# Patient Record
Sex: FEMALE | Race: WHITE | Employment: OTHER | URBAN - METROPOLITAN AREA
[De-identification: names, ages, dates, MRNs, and addresses within clinical notes are randomized per-mention and may not be internally consistent; named-entity substitution may affect disease eponyms.]

---

## 2017-04-06 ENCOUNTER — OFFICE VISIT (OUTPATIENT)
Dept: SLEEP MEDICINE | Facility: CLINIC | Age: 82
End: 2017-04-06
Attending: INTERNAL MEDICINE
Payer: MEDICARE

## 2017-04-06 VITALS
SYSTOLIC BLOOD PRESSURE: 134 MMHG | HEIGHT: 63 IN | HEART RATE: 69 BPM | BODY MASS INDEX: 22.15 KG/M2 | DIASTOLIC BLOOD PRESSURE: 70 MMHG | OXYGEN SATURATION: 95 % | WEIGHT: 125 LBS

## 2017-04-06 DIAGNOSIS — F51.04 PSYCHOPHYSIOLOGICAL INSOMNIA: Primary | ICD-10-CM

## 2017-04-06 PROCEDURE — 99211 OFF/OP EST MAY X REQ PHY/QHP: CPT | Mod: ZF

## 2017-04-06 RX ORDER — ZOLPIDEM TARTRATE 10 MG/1
10 TABLET ORAL
Qty: 30 TABLET | Refills: 5 | Status: SHIPPED | OUTPATIENT
Start: 2017-04-06 | End: 2017-08-10 | Stop reason: SINTOL

## 2017-04-06 NOTE — NURSING NOTE
"Chief Complaint   Patient presents with     RECHECK     Follow up        Initial Ht 1.588 m (5' 2.5\")  Wt 56.7 kg (125 lb)  BMI 22.5 kg/m2 Estimated body mass index is 22.5 kg/(m^2) as calculated from the following:    Height as of this encounter: 1.588 m (5' 2.5\").    Weight as of this encounter: 56.7 kg (125 lb).  Medication Reconciliation: complete      THAD Gonzalez      "

## 2017-04-06 NOTE — PROGRESS NOTES
Abi Whitehead is a 89-year-old female who has had insomnia since childhood and has been on multiple medications for control of insomnia.  She does have a tendency for sleep phase delay; however, she does not attempt to sleep until she is sleepy around midnight.  She is also bothered by chronic back pain and occasionally lies awake related to pain.  Most often, however, she has difficulty falling asleep secondary to an active mind.  She will listen to music or radio shows and occasionally watch television during the night if she has difficulty falling asleep.  We have encouraged her to avoid light exposure including television at night.      This patient has been using zolpidem 10 mg at night without undue carry over in the morning and without confusion at night or falls.  She has been informed about the increased risk of the use of sleep promoting agents at night and this particular dose does exceed recommendations; however, it has been the only effective therapy for her insomnia and she has not had untoward effects using it over a prolonged period of time.  This patient does not have other features of insomnia, such as restless legs syndrome and her sleep timing appears appropriate.  We do question the possibility that she may have inappropriate light exposure at night still at this point or that she may have paradoxical insomnia with over estimation of her wake time.  In this respect, we have ordered actigraphy to better assess her sleep schedule and light exposure over the next 2 weeks.  We will see her in 6 monthly intervals.  We have  discussed possible complications of sleep promoting agents and safety in the home.      Total time spent with the patient 25 minutes, greater than 50% counseling.         MIGDALIA RAMÍREZ MD             D: 2017 12:19   T: 2017 12:59   MT:       Name:     ABI WHITEHEAD   MRN:      [id number]        Account:      [id number]   :      1924           Visit  Date:   04/06/2017      Document: [id number]

## 2017-07-03 ENCOUNTER — TELEPHONE (OUTPATIENT)
Dept: SLEEP MEDICINE | Facility: CLINIC | Age: 82
End: 2017-07-03

## 2017-07-03 NOTE — TELEPHONE ENCOUNTER
[facility] called stating has been filling rx before refill dates on multiple occasion and Pharmacy is not comfortable refill current request for Ambien.  Pharmacy states rx has been filled on 5/01, 5/23, 6/09 and request for today.  Pharmacist would like an ok from Dr. Barrientos before filling.    Per Dr. Barrientos pt will need to be seen before allowed next refill.    THAD Gonzalez

## 2017-08-10 ENCOUNTER — MYC MEDICAL ADVICE (OUTPATIENT)
Dept: SLEEP MEDICINE | Facility: CLINIC | Age: 82
End: 2017-08-10

## 2017-08-10 NOTE — TELEPHONE ENCOUNTER
Ruthie Mccray MA 8/10/2017 8:20 AM CDT        ----- Message -----   From: Poly Toussaint   Sent: 8/10/2017 8:03 AM   To: Sleep [facility]ck[facility]  Subject: Question about medications     Good morning. I am writing because my Mom is having issues with her Ambien now. I think it's because she was without it for a month and now it's too much for her system. I would like to have you cancel her prescription. When she got her new prescription she started taking the original dose which is 1 pill and both nights was up walking around doing and saying weird things and not knowing where she was. So the family suggested she cut the pill in 1/2 or 1/4 since we thought that would work, but even with 1/4 she is having some issues. Like not knowing whether it's morning or night. I understand these are the side effects of Ambien and that's why it's considered a controlled med. She did not have any of these issues the month she was not taking the Ambien. But she didn't sleep well either and was pretty tired. She has agreed to not taking the Ambien anymore and has moved to taking 1 unisom and 1 Ibuprophen Pm. Not sure about these and whether they are a bad combo or not, but  they give her a little relief. You mentioned you didn't have any other options for her.

## 2017-10-02 ENCOUNTER — TELEPHONE (OUTPATIENT)
Dept: SLEEP MEDICINE | Facility: CLINIC | Age: 82
End: 2017-10-02

## 2017-10-02 NOTE — TELEPHONE ENCOUNTER
Good morning,     Can you have DOD give 1.75m ambien Rx x 1 mos for this patient.     Thx, Con      Please print our RX and give to Sukhjinder.  Pt will need to be called to see what pharmacy she would like rx to go to.    THAD Gonzalez

## 2017-10-04 RX ORDER — ZOLPIDEM TARTRATE 1.75 MG/1
1 TABLET SUBLINGUAL
Qty: 30 TABLET | Refills: 0 | Status: SHIPPED | OUTPATIENT
Start: 2017-10-04 | End: 2017-10-12 | Stop reason: SINTOL

## 2017-10-05 NOTE — TELEPHONE ENCOUNTER
Spoke with pt and she has asked that Rx be called into [facility] in [place].    Rx has been called in.    RANCHO GonzalezA

## 2017-10-12 ENCOUNTER — VIRTUAL VISIT (OUTPATIENT)
Dept: SLEEP MEDICINE | Facility: CLINIC | Age: 82
End: 2017-10-12
Payer: MEDICARE

## 2017-10-12 DIAGNOSIS — F51.04 PSYCHOPHYSIOLOGICAL INSOMNIA: Primary | ICD-10-CM

## 2017-10-12 NOTE — MR AVS SNAPSHOT
After Visit Summary   10/12/2017    Poly Toussaint    MRN: [id number]           Patient Information     Date Of Birth          5/11/1924        Visit Information        Provider Department      10/12/2017 2:45 PM [facility]os MD [facility]        Today's Diagnoses     Psychophysiological insomnia    -  1       Follow-ups after your visit        Who to contact     If you have questions or need follow up information about today's clinic visit or your schedule please contact [facility] directly at [phone].  Normal or non-critical lab and imaging results will be communicated to you by Getuihart, letter or phone within 4 business days after the clinic has received the results. If you do not hear from us within 7 days, please contact the clinic through Nutrisystemt or phone. If you have a critical or abnormal lab result, we will notify you by phone as soon as possible.  Submit refill requests through iZ3D or call your pharmacy and they will forward the refill request to us. Please allow 3 business days for your refill to be completed.          Additional Information About Your Visit        MyChart Information     iZ3D gives you secure access to your electronic health record. If you see a primary care provider, you can also send messages to your care team and make appointments. If you have questions, please call your primary care clinic.  If you do not have a primary care provider, please call [phone] and they will assist you.        Care EveryWhere ID     This is your Care EveryWhere ID. This could be used by other organizations to access your [facility] medical records  [id number]         Blood Pressure from Last 3 Encounters:   04/06/17 134/70   10/03/16 140/60   10/03/13 129/55    Weight from Last 3 Encounters:   04/06/17 56.7 kg (125 lb)   10/03/16 56.7 kg (124 lb 14.4 oz)   10/03/13 54.9 kg (121 lb 1.6 oz)              Today, you had the following     No  orders found for display         Today's Medication Changes          These changes are accurate as of: 10/12/17 11:59 PM.  If you have any questions, ask your nurse or doctor.               Stop taking these medicines if you haven't already. Please contact your care team if you have questions.     Zolpidem Tartrate 1.75 MG Subl   Commonly known as:  INTERMYANY                    Primary Care Provider Office Phone # Fax #    Argelia Colin [phone]       [address] [address]        Equal Access to Services     [facility]: Hadii aad ku hadasho Soomaali, waaxda luqadaha, qaybta kaalmada adeegyada, davin woods . So [facility] [phone].    ATENCIÓN: Si habla español, tiene a larkin disposición servicios gratuitos de asistencia lingüística. Eric al [phone].    We comply with applicable federal civil rights laws and [place] laws. We do not discriminate on the basis of race, color, national origin, age, disability, sex, sexual orientation, or gender identity.            Thank you!     Thank you for choosing [facility] [place]  for your care. Our goal is always to provide you with excellent care. Hearing back from our patients is one way we can continue to improve our services. Please take a few minutes to complete the written survey that you may receive in the mail after your visit with us. Thank you!             Your Updated Medication List - Protect others around you: Learn how to safely use, store and throw away your medicines at www.disposemymeds.org.          This list is accurate as of: 10/12/17 11:59 PM.  Always use your most recent med list.                   Brand Name Dispense Instructions for use Diagnosis    aspirin 81 MG EC tablet      Take 81 mg by mouth daily.        atenolol 50 MG tablet    TENORMIN     Take 50 mg by mouth 2 times daily.        CITRACAL/VITAMIN D PO      Take 1 tablet by mouth  2 times daily.        DAILY MULTIVITAMIN PO      Take 0.5 tablets by mouth 2 times daily.        LEVOXYL 75 MCG tablet   Generic drug:  levothyroxine      Take 75 mcg by mouth daily.        LIBRAX PO      Take 1 tablet by mouth daily as needed.        magnesium 250 MG tablet      Take 1 tablet by mouth 2 times daily.        omega-3 fatty acids 1200 MG capsule      Take 1 capsule by mouth 2 times daily.        Turmeric Curcumin Caps      Take 2 capsules by mouth daily.        VITAMIN B COMPLEX PO      Take 0.5 tablets by mouth 2 times daily.

## 2017-10-13 NOTE — PROGRESS NOTES
Poly Toussaint is a 89-year-old woman who has had prolonged history of psychophysiological insomnia and short sleep time, which she has self-managed with multiple sleep promoting agents.  When I first met with Ms. Toussaint in 2016, she acknowledged approximately 4 to 5 hours sleep on a regular basis with a tendency for mild sleep phase delay and did acknowledge taking several agents including diphenhydramine, melatonin, and 10 mg of zolpidem with difficulty initiating sleep.  She was opposed to decreasing the dose of zolpidem or changing any of her medications and simply wanted a refill of the medication.  We have told Ms. Toussaint and her daughter who phoned today that the use of multiple agents, and in particular multiple diphenhydramine agents, is potentially hazardous.  It appears that she has also been prescribed chlordiazepoxide as recently as April of this year.  This agent has a long half-life and would potentially cause excessive sedation.      In this setting, she has had episodes of night eating syndrome with inappropriate food preparation, which she is amnestic of the following morning, as well as occasional ambulation that she is not aware of.      The patient's daughter called today with some concerns regarding her behavior and the fact that she seems less tolerant of zolpidem doses even when taken as low as 2.5 mg.      We have recommended that she discontinue all zolpidem agents, all diphenhydramine agents, and that her daughter review medications in the patient's home setting to make sure there are not multiple sedating agents around.  The daughter is concerned reasonably about safety and falling issues in this setting.  We recognize that the patient has had significant insomnia, but would recommend a minimalist approach to avoid potential toxicity including the use of low dose melatonin in the range of 3-5 mg or even alternative agents, such as lavender essential oil.  The patient in the past has  been somewhat reluctant to consider cognitive behavioral therapy and her [place] location makes it somewhat difficult to offer this on a regular basis.  There has not been evidence of excessive time in bed so sleep restriction would not be a reasonable approach.  She does not have associated symptoms to suggest underlying pain syndrome or restless legs syndrome.  We have also encouraged her to avoid all caffeinated products as she recently acknowledged the use of green tea in the afternoons.      In summary, Abi Whitehead appears to have potential risks related to excessive use of sleep promoting agents and we have recommended minimalist approach to the use of these agents or the use of alternative therapies.  We would certainly be willing to help the patient develop a more structured approach at the point at which she is interested in pursuing it, for cognitive behavioral strategies.      Total time 25 minutes.  Virtual visit requested by the patient.         MIGDALIA RAMÍREZ MD             D: 10/12/2017 15:43   T: 10/12/2017 16:47   MT: CC      Name:     ABI WHITEHEAD   MRN:      [id number]        Account:      [id number]   :      1924           Visit Date:   10/12/2017      Document: [id number]       cc: Argelia Colin MD     Virtual visit requested by patient/daughter 15 min  HPI      ROS      Physical Exam

## 2017-12-07 ENCOUNTER — TELEPHONE (OUTPATIENT)
Dept: SLEEP MEDICINE | Facility: CLINIC | Age: 82
End: 2017-12-07

## 2017-12-07 NOTE — TELEPHONE ENCOUNTER
Reason for Call:  Medication or medication refill:    Do you use a [facility]?  Name of the pharmacy and phone number for the current request: Unknown    Name of the medication requested: Zolpidem 1.75mg    Other request: Patient states that she's been taking OTC medications for sleep, however they are not working. She says before she was given an Ambien 10mg prescription that was too strong so she stopped taking it. But she says that she did take the Zolpidem 1.75mg before and would like to get back on that medication because the OTC meds aren't working. Would you please follow up with the patient and see if this medication can be re-written for the patient and let her know when it would be available for . Please call the mobile phone, as the home phone who is her daughter, Rain is no longer here in [place]. Thank you.    Can we leave a detailed message on this number? YES    Phone number patient can be reached at: [phone], MOBILE.     Best Time: Anytime    Call taken on 12/7/2017 at 4:09 PM by Jose Antonio Miles

## 2017-12-09 ENCOUNTER — NURSE TRIAGE (OUTPATIENT)
Dept: NURSING | Facility: CLINIC | Age: 82
End: 2017-12-09

## 2017-12-09 NOTE — TELEPHONE ENCOUNTER
"  Reason for Disposition    Caller has NON-URGENT medication question about med that PCP prescribed and triager unable to answer question     \"I want a refill on Zolpidem 1.75mg(see epic). I am unable to sleep. My children vetoed it, but I want it back.\" There is  Message per epic. Patient prefers to go into  over the week end, does not want to wait.    Additional Information    Negative: Drug overdose and nurse unable to answer question    Negative: Caller requesting information not related to medicine    Negative: Caller requesting a prescription for Strep throat and has a positive culture result    Negative: Rash while taking a medication or within 3 days of stopping it    Negative: Immunization reaction suspected    Negative: [1] Asthma and [2] having symptoms of asthma (cough, wheezing, etc)    Negative: MORE THAN A DOUBLE DOSE of a prescription or over-the-counter (OTC) drug    Negative: [1] DOUBLE DOSE (an extra dose or lesser amount) of over-the-counter (OTC) drug AND [2] any symptoms (e.g., dizziness, nausea, pain, sleepiness)    Negative: [1] DOUBLE DOSE (an extra dose or lesser amount) of prescription drug AND [2] any symptoms (e.g., dizziness, nausea, pain, sleepiness)    Negative: Took another person's prescription drug    Negative: [1] DOUBLE DOSE (an extra dose or lesser amount) of prescription drug AND [2] NO symptoms (Exception: a double dose of antibiotics)    Negative: Diabetes drug error or overdose (e.g., insulin or extra dose)    Negative: [1] Request for URGENT new prescription or refill of \"essential\" medication (i.e., likelihood of harm to patient if not taken) AND [2] triager unable to fill per unit policy    Negative: [1] Prescription not at pharmacy AND [2] was prescribed today by PCP    Negative: Pharmacy calling with prescription questions and triager unable to answer question    Negative: Caller has URGENT medication question about med that PCP prescribed and triager unable to answer " question    Protocols used: MEDICATION QUESTION CALL-ADULT-AH

## 2017-12-12 ENCOUNTER — TELEPHONE (OUTPATIENT)
Dept: SLEEP MEDICINE | Facility: CLINIC | Age: 82
End: 2017-12-12

## 2017-12-12 NOTE — TELEPHONE ENCOUNTER
Patient's daughter called and would like to talk to you about her mother requesting medication from multiple providers. Has tried the herbal and OTC medications and they don't seem to work. Is wondering if marijuana would be an option. I told her that I have not heard that we are prescribing this at this time but will add it to the note.      Patient's daughter Mary is in [place] asking for a call [phone] back. However we do not have any documentation to support her claims that she is authorized to speak on the patient's behalf.

## 2017-12-14 ENCOUNTER — TELEPHONE (OUTPATIENT)
Dept: SLEEP MEDICINE | Facility: CLINIC | Age: 82
End: 2017-12-14

## 2017-12-14 NOTE — TELEPHONE ENCOUNTER
Telephone call-  Call initiated by daughter Rain who has accompanied her mother during previous clinic visits and used [employer] messaging at her mother's permission.    89 y/o female with life-long irregular sleep pattern and complaint of insomnia who has had adverse effects of sleep promoting agents and escalation of doses at home. In view of all risk and adverse effects, we would not recommend sleep promoting agents. We also explained in response to questions regarding cannibas that this therapy is currently not approved.    Con Iber

## 2017-12-14 NOTE — TELEPHONE ENCOUNTER
See next phone encounter. Dr. Barrientos informed to call patient to discuss.     THAD Galvez  Sleep Clinic-Specialist,    Registered Medical Assistant   [facility]

## 2017-12-15 NOTE — TELEPHONE ENCOUNTER
Patient called and told her the message below and stated that she didn't think it was fair but will just go find another doctor that will giver what she wants. I apologized for the mix up in wether or not she would be getting the ambine like she wanted.

## 2018-03-13 ENCOUNTER — OFFICE VISIT (OUTPATIENT)
Dept: ORTHOPEDICS | Facility: CLINIC | Age: 83
End: 2018-03-13
Payer: MEDICARE

## 2018-03-13 ENCOUNTER — TRANSFERRED RECORDS (OUTPATIENT)
Dept: HEALTH INFORMATION MANAGEMENT | Facility: CLINIC | Age: 83
End: 2018-03-13

## 2018-03-13 ENCOUNTER — MEDICAL CORRESPONDENCE (OUTPATIENT)
Dept: HEALTH INFORMATION MANAGEMENT | Facility: CLINIC | Age: 83
End: 2018-03-13

## 2018-03-13 VITALS
SYSTOLIC BLOOD PRESSURE: 133 MMHG | WEIGHT: 125 LBS | DIASTOLIC BLOOD PRESSURE: 84 MMHG | HEART RATE: 64 BPM | BODY MASS INDEX: 22.5 KG/M2

## 2018-03-13 DIAGNOSIS — M25.551 RIGHT HIP PAIN: Primary | ICD-10-CM

## 2018-03-13 RX ORDER — TRIAMCINOLONE ACETONIDE 40 MG/ML
40 INJECTION, SUSPENSION INTRA-ARTICULAR; INTRAMUSCULAR ONCE
Qty: 1 ML | Refills: 0 | OUTPATIENT
Start: 2018-03-13 | End: 2018-03-13

## 2018-03-13 NOTE — PROGRESS NOTES
SPORTS & ORTHOPEDIC WALK-IN VISIT 3/13/2018    Primary Care Physician: Nette    Reason for visit:     What part of your body is injured / painful?  right hip    What caused the injury /pain? Unsure    How long ago did your injury occur or pain begin? several months ago    What are your most bothersome symptoms? Pain    How would you characterize your symptom?  dull and sharp    What makes your symptoms better? Rest    What makes your symptoms worse? Walking    Have you been previously seen for this problem? Yes, Nette    Medical History:    Any recent changes to your medical history? No    Any new medication prescribed since last visit? No    Have you had surgery on this body part before? No    Social History:    Occupation: [employer]    Handedness: Right    Exercise: None    Review of Systems:    Do you have fever, chills, weight loss? No    Do you have any vision problems? No    Do you have any chest pain or edema? No    Do you have any shortness of breath or wheezing?  No    Do you have stomach problems? No    Do you have any numbness or focal weakness? No    Do you have diabetes? No    Do you have problems with bleeding or clotting? No    Do you have an rashes or other skin lesions? No       Poly is a very pleasant 89 year old woman seen at the request of Dr. Garcia.  She was seen earlier today for lateral right hip pain.  This pain has been present for weeks, no incident that she can recall.  She notes that she was having trouble walking, particularly on the right side, and had to use her cane to initiate gait.  Points to the lateral right side, sometimes pain radiates down to her foot.    She was referred specifically for a hip injection, trochanteric or intraarticular.    She did have an x-ray earlier today.    Vitals:    03/13/18 1509   BP: 133/84   Pulse: 64   Weight: 56.7 kg (125 lb)     I reviewed the x-ray of her hips and pelvis in the room with her and her daughter, Rain.  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  Her left hip has severe osteoarthritis with some preserved joint space.    After some deliberation it was determined that the primary pain generator is more than likely her lateral hip.  I did perform a trochanteric injection today (see procedure note).    She does have signs and symptoms possibly relatable to lumbar radiculopathy, given the symptoms that travel down to her foot.    I would like for her to follow-up in 2 weeks.  If no improvement after this injection we could consider either an intra-articular hip injection, or working upper lumbar spine as a potential cause.    Blane Medina DO CAQSM        Trochanteric Hip Injection  The patient was informed of the risks and the benefits of the procedure and a written consent was signed.  The patient s right lateral hip was prepped with chlorhexidine in sterile fashion.   40 mg of triamcinolone suspension was drawn up into a 5 mL syringe with 2 mL of 1% lidocaine and 2 mL of 0.5% marcaine.  Injection was performed using sterile technique.  A 3.5-inch 25-gauge needle was used to enter the right nancy-trochanteric tissue.  There were no complications. The patient tolerated the procedure well. There was negligible bleeding.   The patient was instructed to ice the hip upon leaving clinic and refrain from overuse over the next 3 days.   The patient was instructed to call or go to the emergency room with any unusual pain, swelling, redness, or if otherwise concerned.  A follow up appointment will be scheduled to evaluate response to the injection, and to assess range of motion and pain.  Kenalog: NDC [id number]

## 2018-03-13 NOTE — LETTER
3/13/2018       RE: Poly Toussaint  [address] [place] [address]     Dear Colleague,    Thank you for referring your patient, Poly Toussaint, to the [facility] SPORTS AND ORTHOPAEDIC WALK IN CLINIC at [facility]. Please see a copy of my visit note below.          SPORTS & ORTHOPEDIC WALK-IN VISIT 3/13/2018    Primary Care Physician: Nette    Reason for visit:     What part of your body is injured / painful?  right hip    What caused the injury /pain? Unsure    How long ago did your injury occur or pain begin? several months ago    What are your most bothersome symptoms? Pain    How would you characterize your symptom?  dull and sharp    What makes your symptoms better? Rest    What makes your symptoms worse? Walking    Have you been previously seen for this problem? Yes, Nette    Medical History:    Any recent changes to your medical history? No    Any new medication prescribed since last visit? No    Have you had surgery on this body part before? No    Social History:    Occupation: [employer]    Handedness: Right    Exercise: None    Review of Systems:    Do you have fever, chills, weight loss? No    Do you have any vision problems? No    Do you have any chest pain or edema? No    Do you have any shortness of breath or wheezing?  No    Do you have stomach problems? No    Do you have any numbness or focal weakness? No    Do you have diabetes? No    Do you have problems with bleeding or clotting? No    Do you have an rashes or other skin lesions? No       Poly is a very pleasant 89 year old woman seen at the request of Dr. Garcia.  She was seen earlier today for lateral right hip pain.  This pain has been present for weeks, no incident that she can recall.  She notes that she was having trouble walking, particularly on the right side, and had to use her cane to initiate gait.  Points to the lateral right side, sometimes pain radiates down to her foot.    She was  referred specifically for a hip injection, trochanteric or intraarticular.    She did have an x-ray earlier today.    Vitals:    03/13/18 1509   BP: 133/84   Pulse: 64   Weight: 56.7 kg (125 lb)     I reviewed the x-ray of her hips and pelvis in the room with her and her daughter, Rain.  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  Her left hip has severe osteoarthritis with some preserved joint space.    After some deliberation it was determined that the primary pain generator is more than likely her lateral hip.  I did perform a trochanteric injection today (see procedure note).    She does have signs and symptoms possibly relatable to lumbar radiculopathy, given the symptoms that travel down to her foot.    I would like for her to follow-up in 2 weeks.  If no improvement after this injection we could consider either an intra-articular hip injection, or working upper lumbar spine as a potential cause.    Blane Medina DO CAQSM        Trochanteric Hip Injection  The patient was informed of the risks and the benefits of the procedure and a written consent was signed.  The patient s right lateral hip was prepped with chlorhexidine in sterile fashion.   40 mg of triamcinolone suspension was drawn up into a 5 mL syringe with 2 mL of 1% lidocaine and 2 mL of 0.5% marcaine.  Injection was performed using sterile technique.  A 3.5-inch 25-gauge needle was used to enter the right nancy-trochanteric tissue.  There were no complications. The patient tolerated the procedure well. There was negligible bleeding.   The patient was instructed to ice the hip upon leaving clinic and refrain from overuse over the next 3 days.   The patient was instructed to call or go to the emergency room with any unusual pain, swelling, redness, or if otherwise concerned.  A follow up appointment will be scheduled to evaluate response to the injection, and to assess range of motion and  pain.  Kenalog: NDC 2170-7415-08

## 2018-03-13 NOTE — MR AVS SNAPSHOT
After Visit Summary   3/13/2018    Poly Toussaint    MRN: [id number]           Patient Information     Date Of Birth          5/11/1924        Visit Information        Provider Department      3/13/2018 3:00 PM Monty Medina,  [facility]        Today's Diagnoses     Right hip pain    -  1       Follow-ups after your visit        Your next 10 appointments already scheduled     Mar 26, 2018  1:30 PM CDT   (Arrive by 1:15 PM)   New Patient Visit with Crow Hall MD   [facility] ([facility])    [address] [place] [id number] [phone]              Who to contact     Please call your clinic at [phone] to:    Ask questions about your health    Make or cancel appointments    Discuss your medicines    Learn about your test results    Speak to your doctor            Additional Information About Your Visit        MyChart Information     [employer] gives you secure access to your electronic health record. If you see a primary care provider, you can also send messages to your care team and make appointments. If you have questions, please call your primary care clinic.  If you do not have a primary care provider, please call [phone] and they will assist you.      [employer] is an electronic gateway that provides easy, online access to your medical records. With [employer], you can request a clinic appointment, read your test results, renew a prescription or communicate with your care team.     To access your existing account, please contact your [facility] or call [phone] for assistance.        Care EveryWhere ID     This is your Care EveryWhere ID. This could be used by other organizations to access your [place] medical records  [id number]        Your Vitals Were     Pulse BMI (Body Mass Index)                64 22.5 kg/m2           Blood Pressure from  Last 3 Encounters:   03/13/18 133/84   04/06/17 134/70   10/03/16 140/60    Weight from Last 3 Encounters:   03/13/18 56.7 kg (125 lb)   04/06/17 56.7 kg (125 lb)   10/03/16 56.7 kg (124 lb 14.4 oz)              Today, you had the following     No orders found for display       Primary Care Provider Office Phone # Fax #    Argelia Colin [phone]       [address] [address]        Equal Access to Services     [facility]: Hadii benoit ayala hadjodi Somaureen, waaxjames saleh, qaoliveta kaalmajames lynn, davin woods . So [facility] [phone].    ATENCIÓN: Si habla español, tiene a larkin disposición servicios gratuitos de asistencia lingüística. [facility] [phone].    We comply with applicable federal civil rights laws and [place] laws. We do not discriminate on the basis of race, color, national origin, age, disability, sex, sexual orientation, or gender identity.            Thank you!     Thank you for choosing [facility] SPORTS AND ORTHOPAEDIC WALK IN CLINIC  for your care. Our goal is always to provide you with excellent care. Hearing back from our patients is one way we can continue to improve our services. Please take a few minutes to complete the written survey that you may receive in the mail after your visit with us. Thank you!             Your Updated Medication List - Protect others around you: Learn how to safely use, store and throw away your medicines at www.disposemymeds.org.          This list is accurate as of 3/13/18  3:38 PM.  Always use your most recent med list.                   Brand Name Dispense Instructions for use Diagnosis    aspirin 81 MG EC tablet      Take 81 mg by mouth daily.        atenolol 50 MG tablet    TENORMIN     Take 50 mg by mouth 2 times daily.        CITRACAL/VITAMIN D PO      Take 1 tablet by mouth 2 times daily.        DAILY MULTIVITAMIN PO      Take 0.5 tablets by mouth 2 times  daily.        LEVOXYL 75 MCG tablet   Generic drug:  levothyroxine      Take 75 mcg by mouth daily.        LIBRAX PO      Take 1 tablet by mouth daily as needed.        magnesium 250 MG tablet      Take 1 tablet by mouth 2 times daily.        omega-3 fatty acids 1200 MG capsule      Take 1 capsule by mouth 2 times daily.        Turmeric Curcumin Caps      Take 2 capsules by mouth daily.        VITAMIN B COMPLEX PO      Take 0.5 tablets by mouth 2 times daily.

## 2018-03-13 NOTE — NURSING NOTE
[address] [address]  Dept: [phone]  ______________________________________________________________________________    Patient: Poly Toussaint   : 1924   MRN: [id number]   2018    INVASIVE PROCEDURE SAFETY CHECKLIST    Date: 3/13/18  Procedure:R GTB Kenalog CSI  Patient Name: Poly Toussaint  MRN: [id number]  YOB: 1924    Action: Complete sections as appropriate. Any discrepancy results in a HARD COPY until resolved.     PRE PROCEDURE:  Patient ID verified with 2 identifiers (name and  or MRN): Yes  Procedure and site verified with patient/designee (when able): Yes  Accurate consent documentation in medical record: Yes  H&P (or appropriate assessment) documented in medical record: Yes  H&P must be up to 20 days prior to procedure and updates within 24 hours of procedure as applicable: Yes  Relevant diagnostic and radiology test results appropriately labeled and displayed as applicable: Yes  Procedure site(s) marked with provider initials: NA    TIMEOUT:  Time-Out performed immediately prior to starting procedure, including verbal and active participation of all team members addressing the following:Yes  * Correct patient identify  * Confirmed that the correct side and site are marked  * An accurate procedure consent form  * Agreement on the procedure to be done  * Correct patient position  * Relevant images and results are properly labeled and appropriately displayed  * The need to administer antibiotics or fluids for irrigation purposes during the procedure as applicable   * Safety precautions based on patient history or medication use    DURING PROCEDURE: Verification of correct person, site, and procedures any time the responsibility for care of the patient is transferred to another member of the care team.     The following medication was given:     MEDICATION:  Kenalog 40 mg  ROUTE: IA  SITE: R hip  DOSE:  1mL  LOT #: [id number]  : [employer]  EXPIRATION DATE: 09/2019  NDC#: 3216-1173-87   Was there drug waste? No     MEDICATION:  Lidocaine without epinephrine  ROUTE: IA  SITE: R hip, GT  DOSE: 4mL  LOT #: [id number]  : [employer]  EXPIRATION DATE: 12/2019  NDC#: 6869-6917-57   Was there drug waste? Yes  Amount of drug waste (mL): 16.  Reason for waste:  Single use vial      Pauline Riley, ATC  March 13, 2018

## 2018-04-23 ENCOUNTER — OFFICE VISIT (OUTPATIENT)
Dept: ORTHOPEDICS | Facility: CLINIC | Age: 83
End: 2018-04-23
Payer: MEDICARE

## 2018-04-23 ENCOUNTER — RADIANT APPOINTMENT (OUTPATIENT)
Dept: GENERAL RADIOLOGY | Facility: CLINIC | Age: 83
End: 2018-04-23
Payer: MEDICARE

## 2018-04-23 VITALS — BODY MASS INDEX: 22.15 KG/M2 | RESPIRATION RATE: 16 BRPM | HEIGHT: 63 IN | WEIGHT: 125 LBS

## 2018-04-23 DIAGNOSIS — M16.11 PRIMARY OSTEOARTHRITIS OF RIGHT HIP: Primary | ICD-10-CM

## 2018-04-23 DIAGNOSIS — M16.11 PRIMARY OSTEOARTHRITIS OF RIGHT HIP: ICD-10-CM

## 2018-04-23 RX ORDER — IOPAMIDOL 408 MG/ML
20 INJECTION, SOLUTION INTRATHECAL ONCE
Status: COMPLETED | OUTPATIENT
Start: 2018-04-23 | End: 2018-04-23

## 2018-04-23 RX ORDER — TRIAMCINOLONE ACETONIDE 40 MG/ML
40 INJECTION, SUSPENSION INTRA-ARTICULAR; INTRAMUSCULAR ONCE
Status: COMPLETED | OUTPATIENT
Start: 2018-04-23 | End: 2018-04-23

## 2018-04-23 RX ORDER — BUPIVACAINE HYDROCHLORIDE 2.5 MG/ML
10 INJECTION, SOLUTION EPIDURAL; INFILTRATION; INTRACAUDAL ONCE
Status: COMPLETED | OUTPATIENT
Start: 2018-04-23 | End: 2018-04-23

## 2018-04-23 RX ORDER — LIDOCAINE HYDROCHLORIDE 10 MG/ML
5 INJECTION, SOLUTION EPIDURAL; INFILTRATION; INTRACAUDAL; PERINEURAL ONCE
Status: COMPLETED | OUTPATIENT
Start: 2018-04-23 | End: 2018-04-23

## 2018-04-23 RX ADMIN — BUPIVACAINE HYDROCHLORIDE 4 ML: 2.5 INJECTION, SOLUTION EPIDURAL; INFILTRATION; INTRACAUDAL at 15:34

## 2018-04-23 RX ADMIN — IOPAMIDOL 2 ML: 408 INJECTION, SOLUTION INTRATHECAL at 15:34

## 2018-04-23 RX ADMIN — TRIAMCINOLONE ACETONIDE 1 ML: 40 INJECTION, SUSPENSION INTRA-ARTICULAR; INTRAMUSCULAR at 15:34

## 2018-04-23 RX ADMIN — LIDOCAINE HYDROCHLORIDE 5 ML: 10 INJECTION, SOLUTION EPIDURAL; INFILTRATION; INTRACAUDAL; PERINEURAL at 15:34

## 2018-04-23 NOTE — LETTER
"  2018      RE: Poly Toussaint   [address] [place] [address]       Sports Medicine Clinic Visit    PCP: Argelia Colin    Poly Toussaint is a 89 year old female who is seen  in consultation at the request of Dr. Colin presenting with right hip pain. She was seen in [facility] by Dr. Medina on 3/13/18. She had a greater trochanteric kenalog injection which pt reports minor relief. The daughter reports that pt unable to WB the past couple months. She uses a cane and walker.     Injury: None    Location of Pain: right hip  Duration of Pain:  year(s)  Rating of Pain: 0/10 in wheelchair; 10/10 WB  Pain is better with: Nothing  Pain is worse with: WB  Additional Features: None  Treatment so far consists of: Injection  Prior History of related problems: None    Resp 16  Ht 5' 2.5\" (1.588 m)  Wt 125 lb (56.7 kg)  BMI 22.5 kg/m2         PMH:  Past Medical History:   Diagnosis Date     Anxiety      Back pain     since her 20's     Chest wall pain 10/1/2013     Diverticulitis      Hiatal hernia      Irritable bowel      Low sodium        Active problem list:  Patient Active Problem List   Diagnosis     Squamous cell carcinoma, face     Generalized osteoarthrosis, unspecified site     Pain in thoracic spine     Chest wall pain       FH:  Family History   Problem Relation Age of Onset     Family History Negative Mother      lived to be 100     CANCER Father       age 97     GASTROINTESTINAL DISEASE Brother       age 94 after several GI surgeries for hiatal hernia       SH:  Social History     Social History     Marital status:      Spouse name: N/A     Number of children: N/A     Years of education: N/A     Occupational History     Not on file.     Social History Main Topics     Smoking status: Former Smoker     Smokeless tobacco: Never Used     Alcohol use Not on file     Drug use: Not on file     Sexual activity: Not on file     Other Topics Concern     Not on file     Social History " Narrative       MEDS:  See EMR, reviewed  ALL:  See EMR, reviewed    REVIEW OF SYSTEMS:  CONSTITUTIONAL:NEGATIVE for fever, chills, change in weight  INTEGUMENTARY/SKIN: NEGATIVE for worrisome rashes, moles or lesions  EYES: NEGATIVE for vision changes or irritation  ENT/MOUTH: NEGATIVE for ear, mouth and throat problems  RESP:NEGATIVE for significant cough or SOB  BREAST: NEGATIVE for masses, tenderness or discharge  CV: NEGATIVE for chest pain, palpitations or peripheral edema  GI: NEGATIVE for nausea, abdominal pain, heartburn, or change in bowel habits  :NEGATIVE for frequency, dysuria, or hematuria  :NEGATIVE for frequency, dysuria, or hematuria  NEURO: NEGATIVE for weakness, dizziness or paresthesias  ENDOCRINE: NEGATIVE for temperature intolerance, skin/hair changes  HEME/ALLERGY/IMMUNE: NEGATIVE for bleeding problems  PSYCHIATRIC: NEGATIVE for changes in mood or affect          SUBJECTIVE:  This is 89-year-old female has right-sided anterior thigh pain with weightbearing over the last month and a half.  At 89 she is very active and up until a month and a half she was walking daily for at least a mile at a time for exercise.  She had a trochanteric injection that did not change her symptoms.  In 03/2018 she had an x-ray of her right hip that shows bone-on-bone degenerative change.  She denies radicular discomfort further down the leg.  She denies low back pain.  She has been healthy to this point without a history of neurovascular disease or cardiovascular disease.  She takes a small aspirin a day, but is on no blood thinners.      OBJECTIVE:  She is nontender today about the lower back and hip.  She has decreased range of motion to the right hip to internal rotation, external rotation and abduction compared to the opposite hip which has adequate range of motion.  Straight leg raise is negative on the right.  Strength is intact bilaterally in the lower extremities to the bilateral hip, knee, ankle  including toe strength and foot evertor strength.  Distal pulses and sensation are intact in the right lower extremity.      ASSESSMENT:  Right-sided hip DJD.      PLAN:  We discussed the option of surgical referral even at her advanced age.  She is adamant that she does not want hip replacement surgery.  We discussed trying an intra-articular right hip cortisone injection under x-ray guidance and she would like to get this done today if possible because her family is from [place] which is 4 hours away and she has no other transportation.  Radiology has an available appointment for this today.  Referral was placed.  She knows that the injection can be done up to 4 times a year if it is helpful.           Crow Hall MD

## 2018-04-23 NOTE — MR AVS SNAPSHOT
"              After Visit Summary   4/23/2018    Poly Toussaint    MRN: [id number]           Patient Information     Date Of Birth          5/11/1924        Visit Information        Provider Department      4/23/2018 1:30 PM Crow Hall MD [facility] Sports Medicine        Today's Diagnoses     Primary osteoarthritis of right hip    -  1       Follow-ups after your visit        Who to contact     Please call your clinic at [phone] to:    Ask questions about your health    Make or cancel appointments    Discuss your medicines    Learn about your test results    Speak to your doctor            Additional Information About Your Visit        MyChart Information     [employer] gives you secure access to your electronic health record. If you see a primary care provider, you can also send messages to your care team and make appointments. If you have questions, please call your primary care clinic.  If you do not have a primary care provider, please call [phone] and they will assist you.      [employer] is an electronic gateway that provides easy, online access to your medical records. With [employer], you can request a clinic appointment, read your test results, renew a prescription or communicate with your care team.     To access your existing account, please contact your [facility] or call [phone] for assistance.        Care EveryWhere ID     This is your Care EveryWhere ID. This could be used by other organizations to access your [place] medical records  [id number]        Your Vitals Were     Respirations Height BMI (Body Mass Index)             16 5' 2.5\" (1.588 m) 22.5 kg/m2          Blood Pressure from Last 3 Encounters:   03/13/18 133/84   04/06/17 134/70   10/03/16 140/60    Weight from Last 3 Encounters:   04/23/18 125 lb (56.7 kg)   03/13/18 125 lb (56.7 kg)   04/06/17 125 lb (56.7 kg)               Primary Care Provider Office Phone # Fax #    Argelia Colin, " [phone]       [address] [address]        Equal Access to Services     LANEY NESBITT : Brent Schwab, yissel saleh, mukul hernandezmajames lynn, davni herediain hayaafelton armendarizghazala dicksonjoe peggy fields. So [facility] [phone].    ATENCIÓN: Si habla español, tiene a larkin disposición servicios gratuitos de asistencia lingüística. Llame al [phone].    We comply with applicable federal civil rights laws and [place] laws. We do not discriminate on the basis of race, color, national origin, age, disability, sex, sexual orientation, or gender identity.            Thank you!     Thank you for choosing [facility]  for your care. Our goal is always to provide you with excellent care. Hearing back from our patients is one way we can continue to improve our services. Please take a few minutes to complete the written survey that you may receive in the mail after your visit with us. Thank you!             Your Updated Medication List - Protect others around you: Learn how to safely use, store and throw away your medicines at www.disposemymeds.org.          This list is accurate as of 4/23/18 11:59 PM.  Always use your most recent med list.                   Brand Name Dispense Instructions for use Diagnosis    aspirin 81 MG EC tablet      Take 81 mg by mouth daily.        atenolol 50 MG tablet    TENORMIN     Take 50 mg by mouth 2 times daily.        CITRACAL/VITAMIN D PO      Take 1 tablet by mouth 2 times daily.        DAILY MULTIVITAMIN PO      Take 0.5 tablets by mouth 2 times daily.        LEVOXYL 75 MCG tablet   Generic drug:  levothyroxine      Take 75 mcg by mouth daily.        LIBRAX PO      Take 1 tablet by mouth daily as needed.        magnesium 250 MG tablet      Take 1 tablet by mouth 2 times daily.        omega-3 fatty acids 1200 MG capsule      Take 1 capsule by mouth 2 times daily.        Turmeric Curcumin Caps      Take 2  capsules by mouth daily.        VITAMIN B COMPLEX PO      Take 0.5 tablets by mouth 2 times daily.

## 2018-04-23 NOTE — PROGRESS NOTES
"Sports Medicine Clinic Visit    PCP: Argelia Colin Breana is a 89 year old female who is seen  in consultation at the request of Dr. Colin presenting with right hip pain. She was seen in [facility] by Dr. Medina on 3/13/18. She had a greater trochanteric kenalog injection which pt reports minor relief. The daughter reports that pt unable to WB the past couple months. She uses a cane and walker.     Injury: None    Location of Pain: right hip  Duration of Pain:  year(s)  Rating of Pain: 0/10 in wheelchair; 10/10 WB  Pain is better with: Nothing  Pain is worse with: WB  Additional Features: None  Treatment so far consists of: Injection  Prior History of related problems: None    Resp 16  Ht 5' 2.5\" (1.588 m)  Wt 125 lb (56.7 kg)  BMI 22.5 kg/m2         PMH:  Past Medical History:   Diagnosis Date     Anxiety      Back pain     since her 20's     Chest wall pain 10/1/2013     Diverticulitis      Hiatal hernia      Irritable bowel      Low sodium        Active problem list:  Patient Active Problem List   Diagnosis     Squamous cell carcinoma, face     Generalized osteoarthrosis, unspecified site     Pain in thoracic spine     Chest wall pain       FH:  Family History   Problem Relation Age of Onset     Family History Negative Mother      lived to be 100     CANCER Father       age 97     GASTROINTESTINAL DISEASE Brother       age 94 after several GI surgeries for hiatal hernia       SH:  Social History     Social History     Marital status:      Spouse name: N/A     Number of children: N/A     Years of education: N/A     Occupational History     Not on file.     Social History Main Topics     Smoking status: Former Smoker     Smokeless tobacco: Never Used     Alcohol use Not on file     Drug use: Not on file     Sexual activity: Not on file     Other Topics Concern     Not on file     Social History Narrative       MEDS:  See EMR, reviewed  ALL:  See EMR, reviewed    REVIEW OF " SYSTEMS:  CONSTITUTIONAL:NEGATIVE for fever, chills, change in weight  INTEGUMENTARY/SKIN: NEGATIVE for worrisome rashes, moles or lesions  EYES: NEGATIVE for vision changes or irritation  ENT/MOUTH: NEGATIVE for ear, mouth and throat problems  RESP:NEGATIVE for significant cough or SOB  BREAST: NEGATIVE for masses, tenderness or discharge  CV: NEGATIVE for chest pain, palpitations or peripheral edema  GI: NEGATIVE for nausea, abdominal pain, heartburn, or change in bowel habits  :NEGATIVE for frequency, dysuria, or hematuria  :NEGATIVE for frequency, dysuria, or hematuria  NEURO: NEGATIVE for weakness, dizziness or paresthesias  ENDOCRINE: NEGATIVE for temperature intolerance, skin/hair changes  HEME/ALLERGY/IMMUNE: NEGATIVE for bleeding problems  PSYCHIATRIC: NEGATIVE for changes in mood or affect          SUBJECTIVE:  This is 89-year-old female has right-sided anterior thigh pain with weightbearing over the last month and a half.  At 89 she is very active and up until a month and a half she was walking daily for at least a mile at a time for exercise.  She had a trochanteric injection that did not change her symptoms.  In 03/2018 she had an x-ray of her right hip that shows bone-on-bone degenerative change.  She denies radicular discomfort further down the leg.  She denies low back pain.  She has been healthy to this point without a history of neurovascular disease or cardiovascular disease.  She takes a small aspirin a day, but is on no blood thinners.      OBJECTIVE:  She is nontender today about the lower back and hip.  She has decreased range of motion to the right hip to internal rotation, external rotation and abduction compared to the opposite hip which has adequate range of motion.  Straight leg raise is negative on the right.  Strength is intact bilaterally in the lower extremities to the bilateral hip, knee, ankle including toe strength and foot evertor strength.  Distal pulses and sensation are  intact in the right lower extremity.      ASSESSMENT:  Right-sided hip DJD.      PLAN:  We discussed the option of surgical referral even at her advanced age.  She is adamant that she does not want hip replacement surgery.  We discussed trying an intra-articular right hip cortisone injection under x-ray guidance and she would like to get this done today if possible because her family is from [place] which is 4 hours away and she has no other transportation.  Radiology has an available appointment for this today.  Referral was placed.  She knows that the injection can be done up to 4 times a year if it is helpful.

## 2018-06-18 ENCOUNTER — MEDICAL CORRESPONDENCE (OUTPATIENT)
Dept: HEALTH INFORMATION MANAGEMENT | Facility: CLINIC | Age: 83
End: 2018-06-18

## 2018-06-28 ENCOUNTER — TRANSFERRED RECORDS (OUTPATIENT)
Dept: HEALTH INFORMATION MANAGEMENT | Facility: CLINIC | Age: 83
End: 2018-06-28

## 2018-06-28 ENCOUNTER — TELEPHONE (OUTPATIENT)
Dept: ORTHOPEDICS | Facility: CLINIC | Age: 83
End: 2018-06-28

## 2018-06-28 ENCOUNTER — MEDICAL CORRESPONDENCE (OUTPATIENT)
Dept: HEALTH INFORMATION MANAGEMENT | Facility: CLINIC | Age: 83
End: 2018-06-28

## 2018-07-09 ENCOUNTER — PRE VISIT (OUTPATIENT)
Dept: UROLOGY | Facility: CLINIC | Age: 83
End: 2018-07-09

## 2018-07-09 NOTE — TELEPHONE ENCOUNTER
MEDICAL RECORDS REQUEST   [facility]  Urology Clinic  [address] [place] [address]  PHONE: [phone]  Fax: [phone]        FUTURE VISIT INFORMATION                                                   Poly Toussaint, : 1924 scheduled for future visit at [facility]    APPOINTMENT INFORMATION:    Date: 2018    Provider:  JAVON DAY    Reason for Visit/Diagnosis: INCONTINENCE    REFERRAL INFORMATION:    Referring provider:  MEET MADRID    Specialty: GENERAL    Referring providers clinic:  [facility] contact number:  : [phone]    RECORDS REQUESTED FOR VISIT                                                     NOTES  STATUS/DETAILS   OFFICE NOTE from referring provider  in process   OFFICE NOTE from other specialist  no   DISCHARGE SUMMARY from hospital  no   DISCHARGE REPORT from the ER  no   OPERATIVE REPORT  no   MEDICATION LIST  yes   LABS     URINALYSIS (UA)  yes       PRE-VISIT CHECKLIST      Record collection complete If no, please explain IN PROCESS   Appointment appropriately scheduled           (right time/right provider) Yes   MyChart activation Yes   Questionnaire complete If no, please explain IN PROCESS     Completed by: Javon Singleton

## 2018-07-23 ASSESSMENT — ENCOUNTER SYMPTOMS
BACK PAIN: 1
MYALGIAS: 0
DYSURIA: 0
HEMATURIA: 0
ARTHRALGIAS: 1
MUSCLE CRAMPS: 0
DIFFICULTY URINATING: 0
MUSCLE WEAKNESS: 0
JOINT SWELLING: 0
FLANK PAIN: 0
NECK PAIN: 0
STIFFNESS: 1

## 2018-07-27 NOTE — TELEPHONE ENCOUNTER
FUTURE VISIT INFORMATION      FUTURE VISIT INFORMATION:    Date: 8/3/18    Time:     Location: [facility]  REFERRAL INFORMATION:    Referring provider:      Referring providers clinic:  [facility]    Reason for visit/diagnosis  hip pain per pt's daughter, Rain. Dr. Gusman referring from [facility]. referral/med recs to be faxed to clinic    RECORDS REQUESTED FROM:       Clinic name Comments Records Status Imaging Status    [facility] referral internal internal                                   RECORDS STATUS

## 2018-07-31 ENCOUNTER — PRE VISIT (OUTPATIENT)
Dept: UROLOGY | Facility: CLINIC | Age: 83
End: 2018-07-31

## 2018-07-31 NOTE — TELEPHONE ENCOUNTER
Reason for visit: Incontinence  consult    Relevant information: Referred by Nette    Records/imaging/labs: Records available    Pt called: No need for a call    Rooming: catheterized PVR/dip

## 2018-08-02 ENCOUNTER — OFFICE VISIT (OUTPATIENT)
Dept: UROLOGY | Facility: CLINIC | Age: 83
End: 2018-08-02
Payer: MEDICARE

## 2018-08-02 VITALS
HEIGHT: 63 IN | SYSTOLIC BLOOD PRESSURE: 128 MMHG | WEIGHT: 123 LBS | BODY MASS INDEX: 21.79 KG/M2 | HEART RATE: 66 BPM | DIASTOLIC BLOOD PRESSURE: 59 MMHG

## 2018-08-02 DIAGNOSIS — R35.1 NOCTURIA: ICD-10-CM

## 2018-08-02 DIAGNOSIS — N39.41 URGE INCONTINENCE: Primary | ICD-10-CM

## 2018-08-02 LAB
APPEARANCE UR: CLEAR
BILIRUB UR QL: NORMAL
COLOR UR: YELLOW
GLUCOSE URINE: NORMAL MG/DL
HGB UR QL: NORMAL
KETONES UR QL: NORMAL MG/DL
LEUKOCYTE ESTERASE URINE: NORMAL
NITRITE UR QL STRIP: NORMAL
PH UR STRIP: 7 PH (ref 5–7)
PROTEIN ALBUMIN URINE: NORMAL MG/DL
SOURCE: NORMAL
SP GR UR STRIP: 1.01 (ref 1–1.03)
UROBILINOGEN UR QL STRIP: 0.2 EU/DL (ref 0.2–1)

## 2018-08-02 ASSESSMENT — ENCOUNTER SYMPTOMS
PANIC: 0
NAUSEA: 0
DISTURBANCES IN COORDINATION: 0
HYPERTENSION: 0
DIARRHEA: 0
SLEEP DISTURBANCES DUE TO BREATHING: 0
DECREASED CONCENTRATION: 0
SNORES LOUDLY: 0
JAUNDICE: 0
ORTHOPNEA: 0
EYE WATERING: 0
RESPIRATORY PAIN: 0
SPEECH CHANGE: 0
HOARSE VOICE: 0
EYE REDNESS: 0
INSOMNIA: 0
ABDOMINAL PAIN: 0
CONSTIPATION: 0
BRUISES/BLEEDS EASILY: 0
SKIN CHANGES: 0
SORE THROAT: 0
EXERCISE INTOLERANCE: 0
WHEEZING: 0
LOSS OF CONSCIOUSNESS: 0
EXTREMITY NUMBNESS: 0
SINUS PAIN: 0
LIGHT-HEADEDNESS: 0
BREAST MASS: 0
COUGH: 0
BLOATING: 0
SINUS CONGESTION: 0
BOWEL INCONTINENCE: 0
CHILLS: 0
DECREASED LIBIDO: 0
SWOLLEN GLANDS: 0
HALLUCINATIONS: 0
TACHYCARDIA: 0
MEMORY LOSS: 0
RECTAL BLEEDING: 0
HYPOTENSION: 0
NECK PAIN: 0
MUSCLE WEAKNESS: 0
HEARTBURN: 0
TASTE DISTURBANCE: 0
HEMOPTYSIS: 0
INCREASED ENERGY: 0
LEG SWELLING: 0
POSTURAL DYSPNEA: 0
BREAST PAIN: 0
SMELL DISTURBANCE: 0
DEPRESSION: 0
TREMORS: 0
HOT FLASHES: 0
SYNCOPE: 0
TINGLING: 0
FATIGUE: 0
ARTHRALGIAS: 1
FEVER: 0
NAIL CHANGES: 0
POOR WOUND HEALING: 0
NUMBNESS: 0
DYSPNEA ON EXERTION: 0
SEIZURES: 0
DIZZINESS: 0
DYSURIA: 0
HEADACHES: 0
NERVOUS/ANXIOUS: 0
PALPITATIONS: 0
WEAKNESS: 0
BLOOD IN STOOL: 0
TROUBLE SWALLOWING: 0
DOUBLE VISION: 0
BACK PAIN: 1
EYE PAIN: 0
MYALGIAS: 0
JOINT SWELLING: 0
WEIGHT LOSS: 0
MUSCLE CRAMPS: 0
DIFFICULTY URINATING: 0
DECREASED APPETITE: 0
ALTERED TEMPERATURE REGULATION: 0
WEIGHT GAIN: 0
HEMATURIA: 0
SHORTNESS OF BREATH: 0
POLYPHAGIA: 0
PARALYSIS: 0
FLANK PAIN: 0
STIFFNESS: 1
POLYDIPSIA: 0
NECK MASS: 0
CLAUDICATION: 0
VOMITING: 0
LEG PAIN: 0
EYE IRRITATION: 0
RECTAL PAIN: 0
SPUTUM PRODUCTION: 0
COUGH DISTURBING SLEEP: 0
NIGHT SWEATS: 0

## 2018-08-02 ASSESSMENT — PAIN SCALES - GENERAL: PAINLEVEL: NO PAIN (0)

## 2018-08-02 NOTE — PROGRESS NOTES
August 2, 2018    Referring Provider: Argelia Colin MD  [address] [address]    Primary Care Provider: Argelia Colin    CC: Urinary incontinence    HPI:  Poly Toussaint is a 89 year old female who presents for evaluation of her pelvic floor symptoms. She is here today with her daughter.  Patient has urinary incontinence throughout the day, with associated urgency. These symptoms have worsened over the past 6 months. The incontinence is especially worse on the way to the bathroom, and at night. She changes her pad 8-10 per day and 3 times nightly. She feels she is fully emptying her bladder. Denies urinary incontinence with sneezing, laughing, and increased physical activity. Denies urinary frequency, dysuria and hematuria. She drinks water and juice throughout the day, and also drinks at least 4 glasses of water at night. She typically has one cup of coffee and one alcoholic beverage per day.    Her history is remarkable for bursitis of her right hip joint and associated hip pain that has worsened in the past 6 months along with the increase in urinary incontinence. A trial injection did not help the pain, and she reports she is not a good candidate for a hip replacement. She is scheduled to follow-up with pain clinic tomorrow.     A number of years ago, she was treated for pelvic organ prolapse with a pessary through a physician at [facility] in [place]. She stopped using the pessary a couple years ago because she was tired of it. She states her prolapse has since resolved. Denies vaginal bulge, bleeding, pain, and itching. Denies pelvic pain. Currently she prefers not to be sexually active, which is not due to pelvic symptoms.     Denies constipation, diarrhea, and straining with bowel movements.    Health maintenance screening:  Pap smear: N/A, previously nml per pt  Colonoscopy: N/A, previously nml per pt  Mammogram: N/A, previously nml per pt     Past  Medical History:   Diagnosis Date     Anxiety      Back pain     since her 20's     Chest wall pain 10/1/2013     Diverticulitis      Hiatal hernia      Hypothyroidism      Insomnia      Irritable bowel      Low sodium      Squamous cell carcinoma of skin        Past Surgical History:   Procedure Laterality Date     STRIP VEIN      15 years ago       Social History     Social History     Marital status:      Spouse name: N/A     Number of children: N/A     Years of education: N/A     Occupational History     Not on file.     Social History Main Topics     Smoking status: Former Smoker     Smokeless tobacco: Never Used     Alcohol use Not on file     Drug use: Not on file     Sexual activity: Not on file     Other Topics Concern     Not on file     Social History Narrative       Family History   Problem Relation Age of Onset     Family History Negative Mother      lived to be 100     Cancer Father       age 97     GASTROINTESTINAL DISEASE Brother       age 94 after several GI surgeries for hiatal hernia       Review of Systems     Constitutional:  Negative for fever, chills, weight loss, weight gain, fatigue, decreased appetite, night sweats, recent stressors, height gain, height loss, post-operative complications, incisional pain, hallucinations, increased energy, hyperactivity and confused.   HENT:  Negative for ear pain, hearing loss, tinnitus, nosebleeds, trouble swallowing, hoarse voice, mouth sores, sore throat, ear discharge, tooth pain, gum tenderness, taste disturbance, smell disturbance, hearing aid, bleeding gums, dry mouth, sinus pain, sinus congestion and neck mass.    Eyes:  Negative for double vision, pain, redness, eye pain, decreased vision, eye watering, eye bulging, eye dryness, flashing lights, spots, floaters, strabismus, tunnel vision, jaundice and eye irritation.   Respiratory:   Negative for cough, hemoptysis, sputum production, shortness of breath, wheezing, sleep disturbances  due to breathing, snores loudly, respiratory pain, dyspnea on exertion, cough disturbing sleep and postural dyspnea.    Cardiovascular:  Negative for chest pain, dyspnea on exertion, palpitations, orthopnea, claudication, leg swelling, fingers/toes turn blue, hypertension, hypotension, syncope, history of heart murmur, chest pain on exertion, chest pain at rest, pacemaker, few scattered varicosities, leg pain, sleep disturbances due to breathing, tachycardia, light-headedness, exercise intolerance and edema.   Gastrointestinal:  Negative for heartburn, nausea, vomiting, abdominal pain, diarrhea, constipation, blood in stool, melena, rectal pain, bloating, hemorrhoids, bowel incontinence, jaundice, rectal bleeding, coffee ground emesis and change in stool.   Genitourinary:  Positive for bladder incontinence. Negative for dysuria, urgency, hematuria, flank pain, vaginal discharge, difficulty urinating, genital sores, dyspareunia, decreased libido, nocturia, voiding less frequently, arousal difficulty, abnormal vaginal bleeding, excessive menstruation, menstrual changes, hot flashes, vaginal dryness and postmenopausal bleeding.   Musculoskeletal:  Positive for back pain, arthralgias and stiffness. Negative for myalgias, joint swelling, muscle cramps, neck pain, bone pain, muscle weakness and fracture.   Skin:  Negative for nail changes, itching, poor wound healing, rash, hair changes, skin changes, acne, warts, poor wound healing, scarring, flaky skin, Raynaud's phenomenon, sensitivity to sunlight and skin thickening.   Neurological:  Negative for dizziness, tingling, tremors, speech change, seizures, loss of consciousness, weakness, light-headedness, numbness, headaches, disturbances in coordination, extremity numbness, memory loss, difficulty walking and paralysis.   Endo/Heme:  Negative for anemia, swollen glands and bruises/bleeds easily.   Psychiatric/Behavioral:  Negative for depression, hallucinations, memory  "loss, decreased concentration, mood swings and panic attacks.    Breast:  Negative for breast discharge, breast mass, breast pain and nipple retraction.   Endocrine:  Negative for altered temperature regulation, polyphagia, polydipsia, unwanted hair growth and change in facial hair.      Allergies   Allergen Reactions     Cats      Dust Mites      Red Dye        Current Outpatient Prescriptions   Medication     aspirin 81 MG EC tablet     atenolol (TENORMIN) 50 MG tablet     B Complex Vitamins (VITAMIN B COMPLEX PO)     Calcium Citrate-Vitamin D (CITRACAL/VITAMIN D PO)     Clidinium-Chlordiazepoxide (LIBRAX PO)     levothyroxine (LEVOXYL) 75 MCG tablet     Magnesium 250 MG tablet     Misc Natural Products (TURMERIC CURCUMIN) CAPS     Multiple Vitamin (DAILY MULTIVITAMIN PO)     Omega-3 Fatty Acids (FISH OIL) 1200 MG capsule     No current facility-administered medications for this visit.        /59  Pulse 66  Ht 1.588 m (5' 2.5\")  Wt 55.8 kg (123 lb)  BMI 22.14 kg/m2 No LMP recorded. Body mass index is 22.14 kg/(m^2).  She is alert and oriented.  She is well groomed, comfortable in no acute distress.Normal mood and affect.   Non-labored breathing. Normocephalic without masses, lesions, obvious abnormalities. Skin dry, warm to touch. No lower extremity edema.  Ambulation limited, she is using wheelchair today.    Urine dip normal by catheterized specimen.     PVR 90 mL by catheter.     A/P: Poly Toussaint is a 89 year old F with urge incontinence in setting of history of pelvic organ prolapse. Her urine today is negative for UTI. Suspect that due to temporal relationship of worsening of both her hip pain and incontinence, the right-sided bursitis is contributing to her urinary symptoms. She reports she is not a good surgical candidate for repair of the hip, so we may not be able to change this factor much.     We discussed a conservative approach to treatment for her urge incontinence due to the side " effect profile of medications typically used, antimuscarinics, being more pronounced with advanced age. We talked about her filling out a bladder diary to further evaluate her urge incontinence and tailor her treatment plan. We will plan to review the diary card at her next visit. In the meantime, she can perform timed voids to help with the incontinence.     Her pelvic organ prolapse is not bothersome to her at this time. Did not evaluate this today since she desires expectant management. We can evaluate at next visit if she wishes.    Scribed by Sho Marie, MS4, [employer], for Maureen Hardwick MD MPH.      I, Maureen Hardwick, agree with above History, Review of Systems, Physical exam and Plan.  I have reviewed the content of the documentation and have edited it as needed. I have personally performed the services documented here and the documentation accurately represents those services and the decisions I have made.      Timed voiding and bladder diary to start    25 minutes were spent with the patient today, > 50% in counseling and coordination of care    Maureen Hardwick MD MPH   of Urology    CC  Patient Care Team:  Meet Madrid MD as PCP - General  Meet Madrid MD as Crow Calle MD as MD (Family Practice)  Meet Madrid MD as Referring Physician (Family Practice)  Maureen Hardwick MD as MD (Urology)  Millicent Loaiza APRN CNP as Nurse Practitioner (Nurse Practitioner - Gerontology)  Lucy Paniagua, JAZMYN as Registered Nurse (Urology)  MEET MADRID

## 2018-08-02 NOTE — MR AVS SNAPSHOT
After Visit Summary   8/2/2018    Poly Toussaint    MRN: [id number]           Patient Information     Date Of Birth          5/11/1924        Visit Information        Provider Department      8/2/2018 1:30 PM Maureen Hardwick MD [facility]        Today's Diagnoses     Urge incontinence    -  1    Nocturia          Care Instructions    Please do a bladder diary    Please return to see us to review    Websites with free information:    American [employer]ro[employer] Society patient website: www.voicesforpfd.org    Total Control Program: www.totalcontrolprogram.com    It was a pleasure meeting with you today.  Thank you for allowing me and my team the privilege of caring for you today.  YOU are the reason we are here, and I truly hope we provided you with the excellent service you deserve.  Please let us know if there is anything else we can do for you so that we can be sure you are leaving completely satisfied with your care experience.                Follow-ups after your visit        Your next 10 appointments already scheduled     Aug 03, 2018  9:20 AM CDT   (Arrive by 9:05 AM)   New Patient Visit with JULIAN Alarcon [facility] ([facility])    [address] [place] [id number] [phone]              Who to contact     Please call your clinic at [phone] to:    Ask questions about your health    Make or cancel appointments    Discuss your medicines    Learn about your test results    Speak to your doctor            Additional Information About Your Visit        MyChart Information     real trendst gives you secure access to your electronic health record. If you see a primary care provider, you can also send messages to your care team and make appointments. If you have questions, please call your primary care clinic.  If you do not have a primary  "care provider, please call [phone] and they will assist you.      [email or web address] is an electronic gateway that provides easy, online access to your medical records. With [email or web address], you can request a clinic appointment, read your test results, renew a prescription or communicate with your care team.     To access your existing account, please contact your [facility] or call [phone] for assistance.        Care EveryWhere ID     This is your Care EveryWhere ID. This could be used by other organizations to access your [facility] medical records  [id number]        Your Vitals Were     Pulse Height BMI (Body Mass Index)             66 1.588 m (5' 2.5\") 22.14 kg/m2          Blood Pressure from Last 3 Encounters:   08/02/18 128/59   03/13/18 133/84   04/06/17 134/70    Weight from Last 3 Encounters:   08/02/18 55.8 kg (123 lb)   04/23/18 56.7 kg (125 lb)   03/13/18 56.7 kg (125 lb)              We Performed the Following     Cath Insertion, Simple (09483)     Urinalysis chemical screen POCT        Primary Care Provider Office Phone # Fax #    Argelia Colin [phone]       [address] [address]        Equal Access to Services     LANEY NESBITT AH: Hadii benoit ku hadasho Soomaali, waaxda luqadaha, qaybta kaalmada adeegyada, waxay giovannain haygustavon elena fields. So [facility] [phone].    ATENCIÓN: Si habla español, tiene a larkin disposición servicios gratuitos de asistencia lingüística. Llame al [phone].    We comply with applicable federal civil rights laws and [place] laws. We do not discriminate on the basis of race, color, national origin, age, disability, sex, sexual orientation, or gender identity.            Thank you!     Thank you for choosing [facility] AND [facility]  for your care. Our goal is always to provide you with excellent care. Hearing back from our patients is one way " we can continue to improve our services. Please take a few minutes to complete the written survey that you may receive in the mail after your visit with us. Thank you!             Your Updated Medication List - Protect others around you: Learn how to safely use, store and throw away your medicines at www.disposemymeds.org.          This list is accurate as of 8/2/18  2:47 PM.  Always use your most recent med list.                   Brand Name Dispense Instructions for use Diagnosis    aspirin 81 MG EC tablet      Take 81 mg by mouth daily.        atenolol 50 MG tablet    TENORMIN     Take 50 mg by mouth 2 times daily.        CITRACAL/VITAMIN D PO      Take 1 tablet by mouth 2 times daily.        DAILY MULTIVITAMIN PO      Take 0.5 tablets by mouth 2 times daily.        LEVOXYL 75 MCG tablet   Generic drug:  levothyroxine      Take 75 mcg by mouth daily.        LIBRAX PO      Take 1 tablet by mouth daily as needed.        magnesium 250 MG tablet      Take 1 tablet by mouth 2 times daily.        omega-3 fatty acids 1200 MG capsule      Take 1 capsule by mouth 2 times daily.        Turmeric Curcumin Caps      Take 2 capsules by mouth daily.        VITAMIN B COMPLEX PO      Take 0.5 tablets by mouth 2 times daily.

## 2018-08-02 NOTE — NURSING NOTE
Using sterile technique I catheterized Poly Toussaint using a 14 fr straight cath without difficulty. I removed the catheter without difficulty after obtaining a urine sample and draining her bladder.

## 2018-08-02 NOTE — LETTER
8/2/2018       RE: Poly Toussaint  [address] [place] [address]     Dear Colleague,    Thank you for referring your patient, Poly Toussaint, to the [facility] UROLOGY AND INST FOR PROSTATE AND UROLOGIC CANCERS at [facility]. Please see a copy of my visit note below.    August 2, 2018    Referring Provider: Argelia Colin MD  [address] [address]    Primary Care Provider: Argelia Colin    CC: Urinary incontinence    HPI:  Poly Toussaint is a 89 year old female who presents for evaluation of her pelvic floor symptoms. She is here today with her daughter.  Patient has urinary incontinence throughout the day, with associated urgency. These symptoms have worsened over the past 6 months. The incontinence is especially worse on the way to the bathroom, and at night. She changes her pad 8-10 per day and 3 times nightly. She feels she is fully emptying her bladder. Denies urinary incontinence with sneezing, laughing, and increased physical activity. Denies urinary frequency, dysuria and hematuria. She drinks water and juice throughout the day, and also drinks at least 4 glasses of water at night. She typically has one cup of coffee and one alcoholic beverage per day.    Her history is remarkable for bursitis of her right hip joint and associated hip pain that has worsened in the past 6 months along with the increase in urinary incontinence. A trial injection did not help the pain, and she reports she is not a good candidate for a hip replacement. She is scheduled to follow-up with pain clinic tomorrow.     A number of years ago, she was treated for pelvic organ prolapse with a pessary through a physician at [facility] in [place]. She stopped using the pessary a couple years ago because she was tired of it. She states her prolapse has since resolved. Denies vaginal bulge, bleeding, pain, and itching. Denies pelvic  pain. Currently she prefers not to be sexually active, which is not due to pelvic symptoms.     Denies constipation, diarrhea, and straining with bowel movements.    Health maintenance screening:  Pap smear: N/A, previously nml per pt  Colonoscopy: N/A, previously nml per pt  Mammogram: N/A, previously nml per pt     Past Medical History:   Diagnosis Date     Anxiety      Back pain     since her 20's     Chest wall pain 10/1/2013     Diverticulitis      Hiatal hernia      Hypothyroidism      Insomnia      Irritable bowel      Low sodium      Squamous cell carcinoma of skin        Past Surgical History:   Procedure Laterality Date     STRIP VEIN      15 years ago       Social History     Social History     Marital status:      Spouse name: N/A     Number of children: N/A     Years of education: N/A     Occupational History     Not on file.     Social History Main Topics     Smoking status: Former Smoker     Smokeless tobacco: Never Used     Alcohol use Not on file     Drug use: Not on file     Sexual activity: Not on file     Other Topics Concern     Not on file     Social History Narrative       Family History   Problem Relation Age of Onset     Family History Negative Mother      lived to be 100     Cancer Father       age 97     GASTROINTESTINAL DISEASE Brother       age 94 after several GI surgeries for hiatal hernia       Review of Systems     Constitutional:  Negative for fever, chills, weight loss, weight gain, fatigue, decreased appetite, night sweats, recent stressors, height gain, height loss, post-operative complications, incisional pain, hallucinations, increased energy, hyperactivity and confused.   HENT:  Negative for ear pain, hearing loss, tinnitus, nosebleeds, trouble swallowing, hoarse voice, mouth sores, sore throat, ear discharge, tooth pain, gum tenderness, taste disturbance, smell disturbance, hearing aid, bleeding gums, dry mouth, sinus pain, sinus congestion and neck mass.     Eyes:  Negative for double vision, pain, redness, eye pain, decreased vision, eye watering, eye bulging, eye dryness, flashing lights, spots, floaters, strabismus, tunnel vision, jaundice and eye irritation.   Respiratory:   Negative for cough, hemoptysis, sputum production, shortness of breath, wheezing, sleep disturbances due to breathing, snores loudly, respiratory pain, dyspnea on exertion, cough disturbing sleep and postural dyspnea.    Cardiovascular:  Negative for chest pain, dyspnea on exertion, palpitations, orthopnea, claudication, leg swelling, fingers/toes turn blue, hypertension, hypotension, syncope, history of heart murmur, chest pain on exertion, chest pain at rest, pacemaker, few scattered varicosities, leg pain, sleep disturbances due to breathing, tachycardia, light-headedness, exercise intolerance and edema.   Gastrointestinal:  Negative for heartburn, nausea, vomiting, abdominal pain, diarrhea, constipation, blood in stool, melena, rectal pain, bloating, hemorrhoids, bowel incontinence, jaundice, rectal bleeding, coffee ground emesis and change in stool.   Genitourinary:  Positive for bladder incontinence. Negative for dysuria, urgency, hematuria, flank pain, vaginal discharge, difficulty urinating, genital sores, dyspareunia, decreased libido, nocturia, voiding less frequently, arousal difficulty, abnormal vaginal bleeding, excessive menstruation, menstrual changes, hot flashes, vaginal dryness and postmenopausal bleeding.   Musculoskeletal:  Positive for back pain, arthralgias and stiffness. Negative for myalgias, joint swelling, muscle cramps, neck pain, bone pain, muscle weakness and fracture.   Skin:  Negative for nail changes, itching, poor wound healing, rash, hair changes, skin changes, acne, warts, poor wound healing, scarring, flaky skin, Raynaud's phenomenon, sensitivity to sunlight and skin thickening.   Neurological:  Negative for dizziness, tingling, tremors, speech change,  "seizures, loss of consciousness, weakness, light-headedness, numbness, headaches, disturbances in coordination, extremity numbness, memory loss, difficulty walking and paralysis.   Endo/Heme:  Negative for anemia, swollen glands and bruises/bleeds easily.   Psychiatric/Behavioral:  Negative for depression, hallucinations, memory loss, decreased concentration, mood swings and panic attacks.    Breast:  Negative for breast discharge, breast mass, breast pain and nipple retraction.   Endocrine:  Negative for altered temperature regulation, polyphagia, polydipsia, unwanted hair growth and change in facial hair.      Allergies   Allergen Reactions     Cats      Dust Mites      Red Dye        Current Outpatient Prescriptions   Medication     aspirin 81 MG EC tablet     atenolol (TENORMIN) 50 MG tablet     B Complex Vitamins (VITAMIN B COMPLEX PO)     Calcium Citrate-Vitamin D (CITRACAL/VITAMIN D PO)     Clidinium-Chlordiazepoxide (LIBRAX PO)     levothyroxine (LEVOXYL) 75 MCG tablet     Magnesium 250 MG tablet     Misc Natural Products (TURMERIC CURCUMIN) CAPS     Multiple Vitamin (DAILY MULTIVITAMIN PO)     Omega-3 Fatty Acids (FISH OIL) 1200 MG capsule     No current facility-administered medications for this visit.        /59  Pulse 66  Ht 1.588 m (5' 2.5\")  Wt 55.8 kg (123 lb)  BMI 22.14 kg/m2 No LMP recorded. Body mass index is 22.14 kg/(m^2).  She is alert and oriented.  She is well groomed, comfortable in no acute distress.Normal mood and affect.   Non-labored breathing. Normocephalic without masses, lesions, obvious abnormalities. Skin dry, warm to touch. No lower extremity edema.  Ambulation limited, she is using wheelchair today.    Urine dip normal by catheterized specimen.     PVR 90 mL by catheter.     A/P: Poly Toussaint is a 89 year old F with urge incontinence in setting of history of pelvic organ prolapse. Her urine today is negative for UTI. Suspect that due to temporal relationship of " worsening of both her hip pain and incontinence, the right-sided bursitis is contributing to her urinary symptoms. She reports she is not a good surgical candidate for repair of the hip, so we may not be able to change this factor much.     We discussed a conservative approach to treatment for her urge incontinence due to the side effect profile of medications typically used, antimuscarinics, being more pronounced with advanced age. We talked about her filling out a bladder diary to further evaluate her urge incontinence and tailor her treatment plan. We will plan to review the diary card at her next visit. In the meantime, she can perform timed voids to help with the incontinence.     Her pelvic organ prolapse is not bothersome to her at this time. Did not evaluate this today since she desires expectant management. We can evaluate at next visit if she wishes.    Scribed by Sho Marie, MS4, [employer], for Maureen Hardwick MD MPH.      I, Maureen Hardwick, agree with above History, Review of Systems, Physical exam and Plan.  I have reviewed the content of the documentation and have edited it as needed. I have personally performed the services documented here and the documentation accurately represents those services and the decisions I have made.      Timed voiding and bladder diary to start    25 minutes were spent with the patient today, > 50% in counseling and coordination of care    Maureen Hardwick MD MPH   of Urology    CC  Patient Care Team:  Argelia Colin MD as PCP - Crow Da Silva MD as MD (Family Practice)  Millicent Loaiza APRN CNP as Nurse Practitioner (Nurse Practitioner - Gerontology)  Lucy Paniagua RN as Registered Nurse (Urology)

## 2018-08-02 NOTE — NURSING NOTE
"Chief Complaint   Patient presents with     Consult     Incontinence consult       Blood pressure 128/59, pulse 66, height 1.588 m (5' 2.5\"), weight 55.8 kg (123 lb). Body mass index is 22.14 kg/(m^2).    Patient Active Problem List   Diagnosis     Squamous cell carcinoma, face     Generalized osteoarthrosis, unspecified site     Pain in thoracic spine     Chest wall pain       Allergies   Allergen Reactions     Cats      Dust Mites      Red Dye        Current Outpatient Prescriptions   Medication Sig Dispense Refill     aspirin 81 MG EC tablet Take 81 mg by mouth daily.       atenolol (TENORMIN) 50 MG tablet Take 50 mg by mouth 2 times daily.       B Complex Vitamins (VITAMIN B COMPLEX PO) Take 0.5 tablets by mouth 2 times daily.       Calcium Citrate-Vitamin D (CITRACAL/VITAMIN D PO) Take 1 tablet by mouth 2 times daily.       Clidinium-Chlordiazepoxide (LIBRAX PO) Take 1 tablet by mouth daily as needed.       levothyroxine (LEVOXYL) 75 MCG tablet Take 75 mcg by mouth daily.       Magnesium 250 MG tablet Take 1 tablet by mouth 2 times daily.       Misc Natural Products (TURMERIC CURCUMIN) CAPS Take 2 capsules by mouth daily.       Multiple Vitamin (DAILY MULTIVITAMIN PO) Take 0.5 tablets by mouth 2 times daily.       Omega-3 Fatty Acids (FISH OIL) 1200 MG capsule Take 1 capsule by mouth 2 times daily.         Social History   Substance Use Topics     Smoking status: Former Smoker     Smokeless tobacco: Never Used     Alcohol use Not on file       THAD Cee  8/2/2018  1:47 PM       "

## 2018-08-02 NOTE — PATIENT INSTRUCTIONS
Please do a bladder diary    Please return to see us to review    Websites with free information:    [employer] patient website: www.[email or web address]    Total Control Program: www.totalcontrolprogram.com    It was a pleasure meeting with you today.  Thank you for allowing me and my team the privilege of caring for you today.  YOU are the reason we are here, and I truly hope we provided you with the excellent service you deserve.  Please let us know if there is anything else we can do for you so that we can be sure you are leaving completely satisfied with your care experience.

## 2018-08-03 ENCOUNTER — PRE VISIT (OUTPATIENT)
Dept: ANESTHESIOLOGY | Facility: CLINIC | Age: 83
End: 2018-08-03

## 2018-10-29 ENCOUNTER — TELEPHONE (OUTPATIENT)
Dept: ORTHOPEDICS | Facility: CLINIC | Age: 83
End: 2018-10-29

## 2018-10-29 DIAGNOSIS — M16.11 PRIMARY OSTEOARTHRITIS OF RIGHT HIP: Primary | ICD-10-CM

## 2018-10-29 NOTE — TELEPHONE ENCOUNTER
----- Message from Crow Hall MD sent at 10/29/2018 12:02 PM CDT -----  Regarding: FW: US guided?  Xray guided is fine.  Can you respond to them and let them know you changed the order?  Thx. pm  ----- Message -----     From: Mary Bautista RN     Sent: 10/29/2018  11:21 AM       To: Crow Hall MD  Subject: US guided?                                       Is there a specific reason this was ordered as an Ultrasounded guided injection vs. Fluoro?  Pt had a fluoro guided injection previously.  It was entered in the order.  We don't typically do them under ultrasound.  Wondering if there was a clinical reason?    JAZMYN Hernandez  [facility]

## 2018-10-29 NOTE — TELEPHONE ENCOUNTER
Call to inform Rain that the ordered for the XR guided hip injection was okay 'd by  and she knows the number to call and get that appointment scheduled.

## 2018-11-29 ENCOUNTER — RADIANT APPOINTMENT (OUTPATIENT)
Dept: GENERAL RADIOLOGY | Facility: CLINIC | Age: 83
End: 2018-11-29
Attending: FAMILY MEDICINE
Payer: MEDICARE

## 2018-11-29 ENCOUNTER — RADIANT APPOINTMENT (OUTPATIENT)
Dept: CT IMAGING | Facility: CLINIC | Age: 83
End: 2018-11-29
Attending: FAMILY MEDICINE
Payer: MEDICARE

## 2018-11-29 ENCOUNTER — OFFICE VISIT (OUTPATIENT)
Dept: ORTHOPEDICS | Facility: CLINIC | Age: 83
End: 2018-11-29
Payer: MEDICARE

## 2018-11-29 VITALS — SYSTOLIC BLOOD PRESSURE: 117 MMHG | RESPIRATION RATE: 16 BRPM | HEART RATE: 65 BPM | DIASTOLIC BLOOD PRESSURE: 57 MMHG

## 2018-11-29 DIAGNOSIS — M16.11 PRIMARY OSTEOARTHRITIS OF RIGHT HIP: ICD-10-CM

## 2018-11-29 DIAGNOSIS — M16.11 PRIMARY OSTEOARTHRITIS OF RIGHT HIP: Primary | ICD-10-CM

## 2018-11-29 RX ORDER — CHLORDIAZEPOXIDE HYDROCHLORIDE AND CLIDINIUM BROMIDE 5; 2.5 MG/1; MG/1
CAPSULE ORAL
Refills: 4 | COMMUNITY
Start: 2018-10-26

## 2018-11-29 RX ORDER — IOPAMIDOL 408 MG/ML
20 INJECTION, SOLUTION INTRATHECAL ONCE
Status: COMPLETED | OUTPATIENT
Start: 2018-11-29 | End: 2018-11-29

## 2018-11-29 RX ORDER — TRIAMCINOLONE ACETONIDE 40 MG/ML
40 INJECTION, SUSPENSION INTRA-ARTICULAR; INTRAMUSCULAR ONCE
Status: COMPLETED | OUTPATIENT
Start: 2018-11-29 | End: 2018-11-29

## 2018-11-29 RX ORDER — LIDOCAINE HYDROCHLORIDE 10 MG/ML
30 INJECTION, SOLUTION EPIDURAL; INFILTRATION; INTRACAUDAL; PERINEURAL ONCE
Status: COMPLETED | OUTPATIENT
Start: 2018-11-29 | End: 2018-11-29

## 2018-11-29 RX ORDER — BUPIVACAINE HYDROCHLORIDE 2.5 MG/ML
30 INJECTION, SOLUTION EPIDURAL; INFILTRATION; INTRACAUDAL ONCE
Status: COMPLETED | OUTPATIENT
Start: 2018-11-29 | End: 2018-11-29

## 2018-11-29 RX ADMIN — BUPIVACAINE HYDROCHLORIDE 10 MG: 2.5 INJECTION, SOLUTION EPIDURAL; INFILTRATION; INTRACAUDAL at 14:47

## 2018-11-29 RX ADMIN — TRIAMCINOLONE ACETONIDE 40 MG: 40 INJECTION, SUSPENSION INTRA-ARTICULAR; INTRAMUSCULAR at 14:47

## 2018-11-29 RX ADMIN — IOPAMIDOL 4 ML: 408 INJECTION, SOLUTION INTRATHECAL at 14:47

## 2018-11-29 RX ADMIN — LIDOCAINE HYDROCHLORIDE 5 ML: 10 INJECTION, SOLUTION EPIDURAL; INFILTRATION; INTRACAUDAL; PERINEURAL at 14:47

## 2018-11-29 NOTE — PROGRESS NOTES
2018: Poly Toussaint is a 89 year old female who is seen in f/u up for primary osteoarthritis of right hip, she is scheduled for an xray guided right hip injection today.        PMH:  Past Medical History:   Diagnosis Date     Anxiety      Back pain     since her 20's     Chest wall pain 10/1/2013     Diverticulitis      Hiatal hernia      Hypothyroidism      Insomnia      Irritable bowel      Low sodium      Squamous cell carcinoma of skin        Active problem list:  Patient Active Problem List   Diagnosis     Squamous cell carcinoma, face     Generalized osteoarthrosis, unspecified site     Pain in thoracic spine     Chest wall pain       FH:  Family History   Problem Relation Age of Onset     Family History Negative Mother      lived to be 100     Cancer Father       age 97     GASTROINTESTINAL DISEASE Brother       age 94 after several GI surgeries for hiatal hernia       SH:  Social History     Social History     Marital status:      Spouse name: N/A     Number of children: N/A     Years of education: N/A     Occupational History     Not on file.     Social History Main Topics     Smoking status: Former Smoker     Smokeless tobacco: Never Used     Alcohol use Not on file     Drug use: Not on file     Sexual activity: Not on file     Other Topics Concern     Not on file     Social History Narrative       MEDS:  See EMR, reviewed  ALL:  See EMR, reviewed    REVIEW OF SYSTEMS:  CONSTITUTIONAL:NEGATIVE for fever, chills, change in weight  INTEGUMENTARY/SKIN: NEGATIVE for worrisome rashes, moles or lesions  EYES: NEGATIVE for vision changes or irritation  ENT/MOUTH: NEGATIVE for ear, mouth and throat problems  RESP:NEGATIVE for significant cough or SOB  BREAST: NEGATIVE for masses, tenderness or discharge  CV: NEGATIVE for chest pain, palpitations or peripheral edema  GI: NEGATIVE for nausea, abdominal pain, heartburn, or change in bowel habits  :NEGATIVE for frequency, dysuria, or  hematuria  :NEGATIVE for frequency, dysuria, or hematuria  NEURO: NEGATIVE for weakness, dizziness or paresthesias  ENDOCRINE: NEGATIVE for temperature intolerance, skin/hair changes  HEME/ALLERGY/IMMUNE: NEGATIVE for bleeding problems  PSYCHIATRIC: NEGATIVE for changes in mood or affect      CT pelvis without contrast  CT right hip without contrast     History: Rule out insufficiency fracture. Unable to bear weight.  Primary osteoarthritis of right hip.     Techniques: Helical acquisition of images through the pelvis was  obtained without administration of contrast. Dedicated right hip  images are also reconstructed.     DLP: 333 mGy-cm     Comparison: Radiograph March 13, 2018.     Findings:     Right hip:     Severe right hip osteoarthrosis with complete joint space loss with  extensive subchondral cystic changes. No CT evidence of articular  surface collapse. There is small intra-articular body. Suspected at  least moderate hip joint effusion.     Internal derangement of joint is not well assessed with CT technique.     Pelvis:     Bones appear diffusely osteopenic. Degenerative changes of bilateral  sacroiliac joints. Mild to moderate degenerative change of left hip  with extensive chondrocalcinosis, and likely intra-articular body.     Pelvic and trochanteric enthesopathic change. Degenerative changes of  pubic symphysis with chondrocalcinosis.     Severe degenerative changes of the visualized lower lumbar spine with  disc vacuum phenomenon at L4-L5 and L5-S1.     Atherosclerotic calcification of aorta and its branches.     Likely atrophic uterus. Colonic diverticulosis. Right lower quadrant  appendix is unremarkable.     Right greater than left fatty infiltration/atrophy of bilateral  gluteus minimus muscles.         IMPRESSION:  1. Severe right hip osteoarthrosis without CT evidence of  insufficiency or displaced fracture. MRI is more sensitive especially  given underlying osteopenia.  2. Severe  degenerative change of the visualized lower lumbar spine.     GEORGE ENCISO             subjective: 7 months ago this 89-year-old female had an intra-articular x-ray guided right hip injection for her severe DJD.  The provided only minimal relief.  Since that time she will walk with a walker but she is adverse to putting any weight on this leg.  According to her daughters this is quite a difference compared to a year and a half ago where she had some discomfort but she was independent in her own walking without assistance.  The family lives in [facility] and so they try to arrange medical visits in the [place] infrequently.  The patient herself is actually in [place] but does not have family members who live close by.    Objective: She has discomfort to internal rotation and external rotation of the right hip and it is only through limited range of motion.  She is nontender over the greater trochanter nontender over the SI joint strength is intact of the hip knee ankle and foot.  Distal pulses and sensation are intact.  She has adequate range of motion at the opposite hip    CT scan done today shows no signs of insufficiency fracture involving the hip or the pelvis but she does have severe degenerative joint changes in the hip that are bone-on-bone with cystic changes and degenerative spurs.    Assessment DJD of the right hip    Plan: The patient's daughter had questions about stem cell assay located someone in the [facility] who would do intra-articular cord blood stem cell injections into the joint for $10,000.  I told her that based on the findings of her x-ray and on the fact that there is not good medical evidence yet to show that these injections provide long-term improvement in the setting of degenerative joint disease that I could not tell her that it would be my recommendation for her to proceed with that.  I think it would be unlikely to provide adequate relief.  We talked about other  options such as PRP injections that can be done on ultrasound.  She knows that I could not also guarantee that this would provide relief and that there is not clear medical evidence to indicate their efficacy as well.  But it would be a less expensive intervention that would cost about $500 out of pocket.  If the cortisone shots to provide some relief she knows that could be done up to 4 times a year.  They also know that hip replacement is an option as we have discussed before.  They are hoping to avoid this at her advanced age which is reasonable.  CT results were discussed with her daughter Dorcas by phone.  She had no further questions.          This note was created with the use of Dragon software and unintentional spelling or errors may have occurred.

## 2018-11-29 NOTE — LETTER
2018      RE: Poly Toussaint   [address] [place] [address]       2018: Poly Toussaint is a 89 year old female who is seen in f/u up for primary osteoarthritis of right hip, she is scheduled for an xray guided right hip injection today.        PMH:  Past Medical History:   Diagnosis Date     Anxiety      Back pain     since her 20's     Chest wall pain 10/1/2013     Diverticulitis      Hiatal hernia      Hypothyroidism      Insomnia      Irritable bowel      Low sodium      Squamous cell carcinoma of skin        Active problem list:  Patient Active Problem List   Diagnosis     Squamous cell carcinoma, face     Generalized osteoarthrosis, unspecified site     Pain in thoracic spine     Chest wall pain       FH:  Family History   Problem Relation Age of Onset     Family History Negative Mother      lived to be 100     Cancer Father       age 97     GASTROINTESTINAL DISEASE Brother       age 94 after several GI surgeries for hiatal hernia       SH:  Social History     Social History     Marital status:      Spouse name: N/A     Number of children: N/A     Years of education: N/A     Occupational History     Not on file.     Social History Main Topics     Smoking status: Former Smoker     Smokeless tobacco: Never Used     Alcohol use Not on file     Drug use: Not on file     Sexual activity: Not on file     Other Topics Concern     Not on file     Social History Narrative       MEDS:  See EMR, reviewed  ALL:  See EMR, reviewed    REVIEW OF SYSTEMS:  CONSTITUTIONAL:NEGATIVE for fever, chills, change in weight  INTEGUMENTARY/SKIN: NEGATIVE for worrisome rashes, moles or lesions  EYES: NEGATIVE for vision changes or irritation  ENT/MOUTH: NEGATIVE for ear, mouth and throat problems  RESP:NEGATIVE for significant cough or SOB  BREAST: NEGATIVE for masses, tenderness or discharge  CV: NEGATIVE for chest pain, palpitations or peripheral edema  GI: NEGATIVE for nausea,  abdominal pain, heartburn, or change in bowel habits  :NEGATIVE for frequency, dysuria, or hematuria  :NEGATIVE for frequency, dysuria, or hematuria  NEURO: NEGATIVE for weakness, dizziness or paresthesias  ENDOCRINE: NEGATIVE for temperature intolerance, skin/hair changes  HEME/ALLERGY/IMMUNE: NEGATIVE for bleeding problems  PSYCHIATRIC: NEGATIVE for changes in mood or affect      CT pelvis without contrast  CT right hip without contrast     History: Rule out insufficiency fracture. Unable to bear weight.  Primary osteoarthritis of right hip.     Techniques: Helical acquisition of images through the pelvis was  obtained without administration of contrast. Dedicated right hip  images are also reconstructed.     DLP: 333 mGy-cm     Comparison: Radiograph March 13, 2018.     Findings:     Right hip:     Severe right hip osteoarthrosis with complete joint space loss with  extensive subchondral cystic changes. No CT evidence of articular  surface collapse. There is small intra-articular body. Suspected at  least moderate hip joint effusion.     Internal derangement of joint is not well assessed with CT technique.     Pelvis:     Bones appear diffusely osteopenic. Degenerative changes of bilateral  sacroiliac joints. Mild to moderate degenerative change of left hip  with extensive chondrocalcinosis, and likely intra-articular body.     Pelvic and trochanteric enthesopathic change. Degenerative changes of  pubic symphysis with chondrocalcinosis.     Severe degenerative changes of the visualized lower lumbar spine with  disc vacuum phenomenon at L4-L5 and L5-S1.     Atherosclerotic calcification of aorta and its branches.     Likely atrophic uterus. Colonic diverticulosis. Right lower quadrant  appendix is unremarkable.     Right greater than left fatty infiltration/atrophy of bilateral  gluteus minimus muscles.         IMPRESSION:  1. Severe right hip osteoarthrosis without CT evidence of  insufficiency or displaced  fracture. MRI is more sensitive especially  given underlying osteopenia.  2. Severe degenerative change of the visualized lower lumbar spine.     GEORGE ENCISO             subjective: 7 months ago this 89-year-old female had an intra-articular x-ray guided right hip injection for her severe DJD.  The provided only minimal relief.  Since that time she will walk with a walker but she is adverse to putting any weight on this leg.  According to her daughters this is quite a difference compared to a year and a half ago where she had some discomfort but she was independent in her own walking without assistance.  The family lives in [facility] and so they try to arrange medical visits in the [place] infrequently.  The patient herself is actually in [place] but does not have family members who live close by.    Objective: She has discomfort to internal rotation and external rotation of the right hip and it is only through limited range of motion.  She is nontender over the greater trochanter nontender over the SI joint strength is intact of the hip knee ankle and foot.  Distal pulses and sensation are intact.  She has adequate range of motion at the opposite hip    CT scan done today shows no signs of insufficiency fracture involving the hip or the pelvis but she does have severe degenerative joint changes in the hip that are bone-on-bone with cystic changes and degenerative spurs.    Assessment DJD of the right hip    Plan: The patient's daughter had questions about stem cell assay located someone in the [facility] who would do intra-articular cord blood stem cell injections into the joint for $10,000.  I told her that based on the findings of her x-ray and on the fact that there is not good medical evidence yet to show that these injections provide long-term improvement in the setting of degenerative joint disease that I could not tell her that it would be my recommendation for her to proceed with that.   I think it would be unlikely to provide adequate relief.  We talked about other options such as PRP injections that can be done on ultrasound.  She knows that I could not also guarantee that this would provide relief and that there is not clear medical evidence to indicate their efficacy as well.  But it would be a less expensive intervention that would cost about $500 out of pocket.  If the cortisone shots to provide some relief she knows that could be done up to 4 times a year.  They also know that hip replacement is an option as we have discussed before.  They are hoping to avoid this at her advanced age which is reasonable.  CT results were discussed with her daughter Dorcas by phone.  She had no further questions.          This note was created with the use of Dragon software and unintentional spelling or errors may have occurred.      Crow Hall MD

## 2018-11-29 NOTE — MR AVS SNAPSHOT
After Visit Summary   11/29/2018    Poly Toussaint    MRN: [id number]           Patient Information     Date Of Birth          5/11/1924        Visit Information        Provider Department      11/29/2018 2:00 PM Crow Hall MD [facility]        Today's Diagnoses     Primary osteoarthritis of right hip    -  1       Follow-ups after your visit        Your next 10 appointments already scheduled     Nov 29, 2018  8:00 PM CST   CT HIP RIGHT W/O CONTRAST with UCCT2   [facility] [place] [facility] ([facility])    [address] [address] [phone]           How do I prepare for my exam? (Food and drink instructions) No Food and Drink Restrictions.  How do I prepare for my exam? (Other instructions) You do not need to do anything special to prepare for this exam. For a sinus scan: Use your nose spray (nasal decongestant spray) as directed.  What should I wear: Please wear loose clothing, such as a sweat suit or jogging clothes. Avoid snaps, zippers and other metal. We may ask you to undress and put on a hospital gown.  How long does the exam take: Most scans take less than 20 minutes.  What should I bring: Please bring any scans or X-rays taken at other hospitals, if similar tests were done. Also bring a list of your medicines, including vitamins, minerals and over-the-counter drugs. It is safest to leave personal items at home.  Do I need a : No  is needed.  What do I need to tell my doctor? Be sure to tell your doctor: * If you have any allergies. * If there s any chance you are pregnant. * If you are breastfeeding.  What should I do after the exam: No restrictions, You may resume normal activities.  What is this test: A CT (computed tomography) scan is a series of pictures that allows us to look inside your body. The scanner creates images of the body in cross sections, much like slices of bread. This helps  us see any problems more clearly.  Who should I call with questions: If you have any questions, please call the Imaging Department where you will have your exam. Directions, parking instructions, and other information is available on our website, [email or web address].            Nov 29, 2018  8:20 PM CST   CT PELVIS BONE WO CONTRAST with UCCT2   [facility] CT ([facility])    [address] [address] [phone]           How do I prepare for my exam? (Food and drink instructions) No Food and Drink Restrictions.  How do I prepare for my exam? (Other instructions) You do not need to do anything special to prepare for this exam. For a sinus scan: Use your nose spray (nasal decongestant spray) as directed.  What should I wear: Please wear loose clothing, such as a sweat suit or jogging clothes. Avoid snaps, zippers and other metal. We may ask you to undress and put on a hospital gown.  How long does the exam take: Most scans take less than 20 minutes.  What should I bring: Please bring any scans or X-rays taken at other hospitals, if similar tests were done. Also bring a list of your medicines, including vitamins, minerals and over-the-counter drugs. It is safest to leave personal items at home.  Do I need a : No  is needed.  What do I need to tell my doctor? Be sure to tell your doctor: * If you have any allergies. * If there s any chance you are pregnant. * If you are breastfeeding.  What should I do after the exam: No restrictions, you may resume normal activities.  What is this test: A CT (computed tomography) scan is a series of pictures that allows us to look inside your body. The scanner creates images of the body in cross sections, much like slices of bread. This helps us see any problems more clearly.  Who should I call with questions: If you have any questions, please call the Imaging Department where you will have your  exam. Directions, parking instructions, and other information are available on our website, [email or web address].              Who to contact     Please call your clinic at [phone] to:    Ask questions about your health    Make or cancel appointments    Discuss your medicines    Learn about your test results    Speak to your doctor            Additional Information About Your Visit        MyChart Information     [facility] gives you secure access to your electronic health record. If you see a primary care provider, you can also send messages to your care team and make appointments. If you have questions, please call your primary care clinic.  If you do not have a primary care provider, please call [phone] and they will assist you.      [employer] [facility] is an electronic gateway that provides easy, online access to your medical records. With [facility], you can request a clinic appointment, read your test results, renew a prescription or communicate with your care team.     To access your existing account, please contact your [facility] or call [phone] for assistance.        Care EveryWhere ID     This is your Care EveryWhere ID. This could be used by other organizations to access your [place] medical records  [id number]        Your Vitals Were     Pulse Respirations                65 16           Blood Pressure from Last 3 Encounters:   11/29/18 117/57   08/02/18 128/59   03/13/18 133/84    Weight from Last 3 Encounters:   08/02/18 123 lb (55.8 kg)   04/23/18 125 lb (56.7 kg)   03/13/18 125 lb (56.7 kg)               Primary Care Provider Office Phone # Fax #    Argelia Colin [phone]       [address] [address]        Equal Access to Services     LANEY NESBITT : yissel Lewis, davin jones. [facility] [phone].    ATENCIÓN:  Si todd cameron, tiene a larkin disposición servicios gratuitos de asistencia lingüística. Eric rodas [phone].    We comply with applicable federal civil rights laws and [place] laws. We do not discriminate on the basis of race, color, national origin, age, disability, sex, sexual orientation, or gender identity.            Thank you!     Thank you for choosing [facility]  for your care. Our goal is always to provide you with excellent care. Hearing back from our patients is one way we can continue to improve our services. Please take a few minutes to complete the written survey that you may receive in the mail after your visit with us. Thank you!             Your Updated Medication List - Protect others around you: Learn how to safely use, store and throw away your medicines at www.disposemymeds.org.          This list is accurate as of 11/29/18  3:20 PM.  Always use your most recent med list.                   Brand Name Dispense Instructions for use Diagnosis    aspirin 81 MG EC tablet    ASA     Take 81 mg by mouth daily.        atenolol 50 MG tablet    TENORMIN     Take 50 mg by mouth 2 times daily.        CITRACAL/VITAMIN D PO      Take 1 tablet by mouth 2 times daily.        DAILY MULTIVITAMIN PO      Take 0.5 tablets by mouth 2 times daily.        LEVOXYL 75 MCG tablet   Generic drug:  levothyroxine      Take 75 mcg by mouth daily.        * clidinium-chlordiazePOXIDE 5-2.5 MG capsule    LIBRAX          * LIBRAX PO      Take 1 tablet by mouth daily as needed.        magnesium 250 MG tablet      Take 1 tablet by mouth 2 times daily.        omega-3 fatty acids 1200 MG capsule      Take 1 capsule by mouth 2 times daily.        Turmeric Curcumin Caps      Take 2 capsules by mouth daily.        VITAMIN B COMPLEX PO      Take 0.5 tablets by mouth 2 times daily.        * Notice:  This list has 2 medication(s) that are the same as other medications prescribed for you. Read the directions carefully,  and ask your doctor or other care provider to review them with you.

## 2019-09-17 ENCOUNTER — ANCILLARY PROCEDURE (OUTPATIENT)
Dept: GENERAL RADIOLOGY | Facility: CLINIC | Age: 84
End: 2019-09-17
Attending: FAMILY MEDICINE
Payer: MEDICARE

## 2019-09-17 DIAGNOSIS — M16.11 PRIMARY OSTEOARTHRITIS OF RIGHT HIP: ICD-10-CM

## 2019-09-17 RX ORDER — LIDOCAINE HYDROCHLORIDE 10 MG/ML
30 INJECTION, SOLUTION EPIDURAL; INFILTRATION; INTRACAUDAL; PERINEURAL ONCE
Status: COMPLETED | OUTPATIENT
Start: 2019-09-17 | End: 2019-09-17

## 2019-09-17 RX ORDER — IOPAMIDOL 408 MG/ML
20 INJECTION, SOLUTION INTRATHECAL ONCE
Status: COMPLETED | OUTPATIENT
Start: 2019-09-17 | End: 2019-09-17

## 2019-09-17 RX ORDER — TRIAMCINOLONE ACETONIDE 40 MG/ML
40 INJECTION, SUSPENSION INTRA-ARTICULAR; INTRAMUSCULAR ONCE
Status: COMPLETED | OUTPATIENT
Start: 2019-09-17 | End: 2019-09-17

## 2019-09-17 RX ORDER — BUPIVACAINE HYDROCHLORIDE 2.5 MG/ML
10 INJECTION, SOLUTION EPIDURAL; INFILTRATION; INTRACAUDAL ONCE
Status: COMPLETED | OUTPATIENT
Start: 2019-09-17 | End: 2019-09-17

## 2019-09-17 RX ADMIN — LIDOCAINE HYDROCHLORIDE 5 ML: 10 INJECTION, SOLUTION EPIDURAL; INFILTRATION; INTRACAUDAL; PERINEURAL at 14:06

## 2019-09-17 RX ADMIN — BUPIVACAINE HYDROCHLORIDE 5 MG: 2.5 INJECTION, SOLUTION EPIDURAL; INFILTRATION; INTRACAUDAL at 14:06

## 2019-09-17 RX ADMIN — TRIAMCINOLONE ACETONIDE 40 MG: 40 INJECTION, SUSPENSION INTRA-ARTICULAR; INTRAMUSCULAR at 14:06

## 2019-09-17 RX ADMIN — IOPAMIDOL 10 ML: 408 INJECTION, SOLUTION INTRATHECAL at 14:06

## 2019-10-05 ENCOUNTER — HEALTH MAINTENANCE LETTER (OUTPATIENT)
Age: 84
End: 2019-10-05

## 2020-02-10 ENCOUNTER — HEALTH MAINTENANCE LETTER (OUTPATIENT)
Age: 85
End: 2020-02-10

## 2020-08-25 DIAGNOSIS — L30.9 DERMATITIS: Primary | ICD-10-CM

## 2020-08-25 RX ORDER — CLOBETASOL PROPIONATE 0.5 MG/G
CREAM TOPICAL 2 TIMES DAILY
Qty: 30 G | Refills: 0 | Status: SHIPPED | OUTPATIENT
Start: 2020-08-25 | End: 2020-09-04

## 2020-09-04 DIAGNOSIS — L30.9 DERMATITIS: ICD-10-CM

## 2020-09-04 RX ORDER — CLOBETASOL PROPIONATE 0.5 MG/G
CREAM TOPICAL 2 TIMES DAILY
Qty: 30 G | Refills: 0 | Status: SHIPPED | OUTPATIENT
Start: 2020-09-04

## 2020-11-14 ENCOUNTER — HEALTH MAINTENANCE LETTER (OUTPATIENT)
Age: 85
End: 2020-11-14

## 2021-02-05 ENCOUNTER — IMMUNIZATION (OUTPATIENT)
Dept: PEDIATRICS | Facility: CLINIC | Age: 86
End: 2021-02-05
Payer: MEDICARE

## 2021-02-05 PROCEDURE — 91300 PR COVID VAC PFIZER DIL RECON 30 MCG/0.3 ML IM: CPT

## 2021-02-05 PROCEDURE — 0001A PR COVID VAC PFIZER DIL RECON 30 MCG/0.3 ML IM: CPT

## 2021-02-26 ENCOUNTER — IMMUNIZATION (OUTPATIENT)
Dept: PEDIATRICS | Facility: CLINIC | Age: 86
End: 2021-02-26
Attending: INTERNAL MEDICINE
Payer: MEDICARE

## 2021-02-26 PROCEDURE — 91300 PR COVID VAC PFIZER DIL RECON 30 MCG/0.3 ML IM: CPT

## 2021-02-26 PROCEDURE — 0002A PR COVID VAC PFIZER DIL RECON 30 MCG/0.3 ML IM: CPT

## 2021-03-28 ENCOUNTER — HEALTH MAINTENANCE LETTER (OUTPATIENT)
Age: 86
End: 2021-03-28

## 2021-09-12 ENCOUNTER — HEALTH MAINTENANCE LETTER (OUTPATIENT)
Age: 86
End: 2021-09-12

## 2022-04-17 ENCOUNTER — MYC MEDICAL ADVICE (OUTPATIENT)
Dept: UROLOGY | Facility: CLINIC | Age: 87
End: 2022-04-17
Payer: MEDICARE

## 2022-04-24 ENCOUNTER — HEALTH MAINTENANCE LETTER (OUTPATIENT)
Age: 87
End: 2022-04-24

## 2022-11-19 ENCOUNTER — HEALTH MAINTENANCE LETTER (OUTPATIENT)
Age: 87
End: 2022-11-19

## 2023-06-01 ENCOUNTER — HEALTH MAINTENANCE LETTER (OUTPATIENT)
Age: 88
End: 2023-06-01

## 2024-06-15 ENCOUNTER — HEALTH MAINTENANCE LETTER (OUTPATIENT)
Age: 89
End: 2024-06-15

## (undated) RX ORDER — TRIAMCINOLONE ACETONIDE 40 MG/ML
INJECTION, SUSPENSION INTRA-ARTICULAR; INTRAMUSCULAR
Status: DISPENSED
Start: 2018-11-29

## (undated) RX ORDER — LIDOCAINE HYDROCHLORIDE 10 MG/ML
INJECTION, SOLUTION EPIDURAL; INFILTRATION; INTRACAUDAL; PERINEURAL
Status: DISPENSED
Start: 2019-09-17

## (undated) RX ORDER — BUPIVACAINE HYDROCHLORIDE 2.5 MG/ML
INJECTION, SOLUTION EPIDURAL; INFILTRATION; INTRACAUDAL
Status: DISPENSED
Start: 2018-11-29

## (undated) RX ORDER — TRIAMCINOLONE ACETONIDE 40 MG/ML
INJECTION, SUSPENSION INTRA-ARTICULAR; INTRAMUSCULAR
Status: DISPENSED
Start: 2019-09-17

## (undated) RX ORDER — BUPIVACAINE HYDROCHLORIDE 2.5 MG/ML
INJECTION, SOLUTION EPIDURAL; INFILTRATION; INTRACAUDAL
Status: DISPENSED
Start: 2018-04-23

## (undated) RX ORDER — TRIAMCINOLONE ACETONIDE 40 MG/ML
INJECTION, SUSPENSION INTRA-ARTICULAR; INTRAMUSCULAR
Status: DISPENSED
Start: 2018-04-23

## (undated) RX ORDER — BUPIVACAINE HYDROCHLORIDE 2.5 MG/ML
INJECTION, SOLUTION EPIDURAL; INFILTRATION; INTRACAUDAL
Status: DISPENSED
Start: 2019-09-17

## (undated) RX ORDER — LIDOCAINE HYDROCHLORIDE 10 MG/ML
INJECTION, SOLUTION EPIDURAL; INFILTRATION; INTRACAUDAL; PERINEURAL
Status: DISPENSED
Start: 2018-11-29

## (undated) RX ORDER — TRIAMCINOLONE ACETONIDE 40 MG/ML
INJECTION, SUSPENSION INTRA-ARTICULAR; INTRAMUSCULAR
Status: DISPENSED
Start: 2018-03-13

## (undated) RX ORDER — LIDOCAINE HYDROCHLORIDE 10 MG/ML
INJECTION, SOLUTION EPIDURAL; INFILTRATION; INTRACAUDAL; PERINEURAL
Status: DISPENSED
Start: 2018-04-23

## (undated) RX ORDER — LIDOCAINE HYDROCHLORIDE 10 MG/ML
INJECTION, SOLUTION INFILTRATION; PERINEURAL
Status: DISPENSED
Start: 2018-03-13